# Patient Record
Sex: FEMALE | Race: WHITE | NOT HISPANIC OR LATINO | Employment: STUDENT | URBAN - METROPOLITAN AREA
[De-identification: names, ages, dates, MRNs, and addresses within clinical notes are randomized per-mention and may not be internally consistent; named-entity substitution may affect disease eponyms.]

---
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
## 2018-01-14 VITALS
HEIGHT: 64 IN | OXYGEN SATURATION: 99 % | TEMPERATURE: 99.1 F | HEART RATE: 81 BPM | WEIGHT: 130 LBS | BODY MASS INDEX: 22.2 KG/M2 | DIASTOLIC BLOOD PRESSURE: 64 MMHG | SYSTOLIC BLOOD PRESSURE: 110 MMHG

## 2018-01-18 NOTE — PROGRESS NOTES
Assessment    1  Encounter for preventive health examination (V70 0) (Z00 00)    Plan  Health Maintenance    · Follow-up visit in 1 year Evaluation and Treatment  Follow-up  Status: Hold For -  Scheduling  Requested for: [id number]    Discussion/Summary    Impression:   No growth, development, feeding, skin and sleep concerns  no medical problems  Anticipatory guidance addressed as per the history of present illness section  No vaccines needed  She is not on any medications  Information discussed with patient and Parent/Guardian  Pt is to follow up if she feels she is experiencing depression since her screen was showing mild depression  Pt does not think she is depressed  Forms are completed  The patient, patient's family was counseled regarding diagnostic results, instructions for management, risk factor reductions, patient and family education, impressions, risks and benefits of treatment options, importance of compliance with treatment  Possible side effects of new medications were reviewed with the patient/guardian today  The treatment plan was reviewed with the patient/guardian  The patient/guardian understands and agrees with the treatment plan     Self Referrals: No      Chief Complaint  Patient here today for college physical  No complaint of pain  Patient needs forms completed and eye exam       History of Present Illness  HM, 12-18 years Female (Brief): Cale Cota presents today for routine health maintenance with her mother  General Health: The child's health since the last visit is described as good   no illness since last visit  Dental hygiene: Good  Immunization status: Up to date   the patient has not had any significant adverse reactions to immunizations  Caregiver concerns:   Caregivers deny concerns regarding nutrition, sleep, behavior, school, development and elimination  Menstrual status:  The patient is menarcheal    Menses: Menstrual history:   LMP: it was of a normal amount and duration  The cycles have been regular  Nutrition/Elimination:   Diet:  her current diet is diverse and healthy  The patient does not use dietary supplements  No elimination issues are expressed  Sleep:  No sleep issues are reported  Behavior: The child's temperament is described as calm  No behavior issues identified  Health Risks:  No significant risk factors are identified  Childcare/School: The child stays home alone  Childcare is provided in the child's home  She is college  School performance has been excellent  Sports Participation Questions:   HPI: 23year old female here with her stepmother for check up and completion and verification of forms and immunizations for [facility]  Review of Systems    Constitutional: No complaints of fever or chills, feels well, no tiredness, no recent weight gain or loss  Eyes: No complaints of eye pain, no discharge, no eyesight problems, eyes do not itch, no red or dry eyes  ENT: no complaints of nasal discharge, no hoarseness, no earache, no nosebleeds, no loss of hearing, no sore throat  Cardiovascular: No complaints of chest pain, no palpitations, normal heart rate, no lower extremity edema  Respiratory: No complaints of cough, no shortness of breath, no wheezing, no leg claudication  Gastrointestinal: No complaints of abdominal pain, no nausea or vomiting, no constipation, no diarrhea or bloody stools  Genitourinary: No complaints of incontinence, no pelvic pain, no dysuria or dysmenorrhea, no abnormal vaginal bleeding or vaginal discharge  Musculoskeletal: No complaints of limb swelling or limb pain, no myalgias, no joint swelling or joint stiffness  Integumentary: No complaints of skin rash, no skin lesions or wounds, no itching, no breast pain, no breast lump  Neurological: No complaints of headache, no numbness or tingling, no confusion, no dizziness, no limb weakness, no convulsions or fainting, no difficulty walking  Psychiatric: No complaints of feeling depressed, no suicidal thoughts, no emotional problems, no anxiety, no sleep disturbances, no change in personality  Endocrine: No complaints of feeling weak, no muscle weakness, no deepening of voice, no hot flashes or proptosis  Hematologic/Lymphatic: No complaints of swollen glands, no neck swollen glands, does not bleed or bruise easily  ROS reported by the patient  ROS reviewed  Past Medical History    · History of Cough with congestion of paranasal sinus (461 9,786 2) (J01 80,R05)   · History of acute otitis media (V12 49) (Z86 69)   · History of Need for Menactra vaccination (V03 89) (Z23)    Family History  Mother    · Family history of Asthma (493 90) (J45 909)  Father    · Family history of Hypertension (401 9) (I10)  Sister    · Family history of Asthma (493 90) (J37 80)  Paternal Grandmother    · Family history of Diabetes (250 00) (E11 9)  Paternal Grandfather    · Family history of Heart disease (429 9) (I51 9)   · Family history of Hypertension (401 9) (I10)  Paternal Uncle    · Family history of Hypertension (401 9) (I10)    Social History    · Does not drink alcohol (V49 89) (Z78 9)   · Never a smoker   · No drug use    Current Meds   1  No Reported Medications Recorded    Allergies    1  No Known Drug Allergies    Immunizations   ** Printed in Appendix #1 below  Vitals   Recorded: [id number] 09:40AM   Heart Rate 69   Systolic 317   Diastolic 62   Height 5 ft 4 in   Weight 126 lb 6 4 oz   BMI Calculated 21 7   BSA Calculated 1 61   BMI Percentile 53 %   2-20 Stature Percentile 46 %   2-20 Weight Percentile 52 %   O2 Saturation 99     Physical Exam    Constitutional - General appearance: No acute distress, well appearing and well nourished  Eyes - Conjunctiva and lids: No injection, edema or discharge  Pupils and irises: Equal, round, reactive to light bilaterally     Ears, Nose, Mouth, and Throat - External inspection of ears and nose: Normal without deformities or discharge  Otoscopic examination: Tympanic membranes gray, translucent with good bony landmarks and light reflex  Canals patent without erythema  Oropharynx: Moist mucosa, normal tongue and tonsils without lesions  Neck - Neck: Supple, symmetric, no masses  Pulmonary - Respiratory effort: Normal respiratory rate and rhythm, no increased work of breathing  Auscultation of lungs: Clear bilaterally  Cardiovascular - Auscultation of heart: Regular rate and rhythm, normal S1 and S2, no murmur  Pedal pulses: Normal, 2+ bilaterally  Examination of extremities for edema and/or varicosities: Normal    Abdomen - Abdomen: Normal bowel sounds, soft, non-tender, no masses  Liver and spleen: No hepatomegaly or splenomegaly  Lymphatic - Palpation of lymph nodes in neck: No anterior or posterior cervical lymphadenopathy  Musculoskeletal - Gait and station: Normal gait  Digits and nails: Normal without clubbing or cyanosis  Inspection/palpation of joints, bones, and muscles: Normal    Skin - Skin and subcutaneous tissue: Normal    Neurologic - Cranial nerves: Normal  Reflexes: Normal  Sensation: Normal    Psychiatric - Orientation to person, place, and time: Normal  Mood and affect: Normal       Results/Data  PHQ-9 Adult Depression Screening [id number] 10:30AM User, [facility] [employer]     Test Name Result Flag Reference   PHQ-9 Adult Depression Score 9     Over the last two weeks, how often have you been bothered by any of the following problems?   Little interest or pleasure in doing things: Not at all - 0  Feeling down, depressed, or hopeless: Not at all - 0  Trouble falling or staying asleep, or sleeping too much: Several days - 1  Feeling tired or having little energy: Nearly every day - 3  Poor appetite or over eating: Not at all - 0  Feeling bad about yourself - or that you are a failure or have let yourself or your family down: Several days - 1  Trouble concentrating on things, such as reading the newspaper or watching television: More than half the days - 2  Moving or speaking so slowly that other people could have noticed   Or the opposite -  being so fidgety or restless that you have been moving around a lot more than usual: More than half the days - 2  Thoughts that you would be better off dead, or of hurting yourself in some way: Not at all - 0   PHQ-9 Adult Depression Screening Negative     PHQ-9 Difficulty Level Not difficult at all     PHQ-9 Severity Mild Depression         Procedure    Procedure:   Results: 20/20 in both eyes without corrective device, 20/30 in the right eye without corrective device, 20/25 in the left eye without corrective device, 20/13 in both eyes with corrective device, 20/13 in the right eye with corrective device, 20/10 in the left eye with corrective device      Signatures   Electronically signed by : JENAE Rae; 2017 12:08PM EST                       (Author)    Electronically signed by : JUSTIN Chapman ; 2017 12:58PM EST                          Appendix #1     Patient: Tahmina Fragoso; : 2000; MRN: [id number]      1 2 3 4 5    DTP/DTaP  04-Mar-1999  (3m) 11-May-1999  (6m) 15-Oct-1999  (11m) 2001  (2y) 2004  (5y)    Hepatitis B  04-Mar-1999  (3m) 25-May-1999  (6m) 2000  (14m)      HIB  04-Mar-1999  (3m) 25-May-1999  (6m) 05-Oct-1999  (10m) 2001  (2y)     Meningococcal  09-Mar-2010  (11y) 20-Oct-2016  (17y)       MMR  2001  (2y) 2004  (5y)       Polio  1999  (55d) 25-May-1999  (6m) 2000  (14m) 2004  (5y)     Tdap  09-Mar-2010  (11y)        Varicella  2000  (14m) 09-Mar-2010  (11y)

## 2018-01-22 VITALS
BODY MASS INDEX: 21.58 KG/M2 | HEART RATE: 69 BPM | HEIGHT: 64 IN | SYSTOLIC BLOOD PRESSURE: 102 MMHG | OXYGEN SATURATION: 99 % | DIASTOLIC BLOOD PRESSURE: 62 MMHG | WEIGHT: 126.4 LBS

## 2018-01-23 NOTE — PROGRESS NOTES
Assessment    1  Encounter for female birth control (V25 8) (Z27 46)    Plan  Encounter for female birth control    · Junel FE 1/20 1-20 MG-MCG Oral Tablet; take 1 tablet by mouth every day    Discussion/Summary    Patient's birth control pill is renewed  She had her screening for her eye exam on her 's permit  Her health maintenance visit was done in July and has not changed  She is to follow up for a gyn exam and cervical cultures  The patient was counseled regarding instructions for management, risk factor reductions, impressions, risks and benefits of treatment options, importance of compliance with treatment  Possible side effects of new medications were reviewed with the patient/guardian today  The treatment plan was reviewed with the patient/guardian  The patient/guardian understands and agrees with the treatment plan     Self Referrals: No      Chief Complaint  Patient is here for PE for learner's permit and is requesting a prescription for birth control  Patient was receiving Junel Fe 1/20 28-day through school but needs a refill now that she is home  No complaint of pain  History of Present Illness  HM, Adult Female: The patient is being seen for a health maintenance evaluation  The last health maintenance visit was 6 month(s) ago  General Health: The patient's health since the last visit is described as good  Screening:   HPI: 49-year-old female here with her mother for 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  She is also here for birth control pill refill in check  Patient had exam and STD testing while at college  All was normal  She will follow-up here for a gyn exam and cervical cultures  She is not having any problems at this point with the birth control pill      Review of Systems    Constitutional: No fever, no chills, feels well, no tiredness, no recent weight gain or weight loss     Eyes: No complaints of eye pain, no red eyes, no eyesight problems, no discharge, no dry eyes, no itching of eyes  Cardiovascular: No complaints of slow heart rate, no fast heart rate, no chest pain, no palpitations, no leg claudication, no lower extremity edema  Respiratory: No complaints of shortness of breath, no wheezing, no cough, no SOB on exertion, no orthopnea, no PND  Genitourinary: No complaints of dysuria, no incontinence, no pelvic pain, no dysmenorrhea, no vaginal discharge or bleeding  ROS reviewed  Past Medical History    · History of Cough with congestion of paranasal sinus (461 9,786 2) (J01 80,R05)   · History of acute otitis media (V12 49) (Z86 69)   · History of Need for Menactra vaccination (V03 89) (Z23)    Family History  Mother    · Family history of Asthma (493 90) (J45 909)  Father    · Family history of Hypertension (401 9) (I10)  Sister    · Family history of Asthma (493 90) (J37 80)  Paternal Grandmother    · Family history of Diabetes (250 00) (E11 9)  Paternal Grandfather    · Family history of Heart disease (429 9) (I51 9)   · Family history of Hypertension (401 9) (I10)  Paternal Uncle    · Family history of Hypertension (401 9) (I10)    Social History    · Does not drink alcohol (V49 89) (Z78 9)   · Never a smoker   · No drug use    Current Meds   1  No Reported Medications Recorded    Allergies    1  No Known Drug Allergies    Vitals   Recorded: 01NRV0543 09:33AM   Heart Rate 84   Systolic 650   Diastolic 74   Height 5 ft 4 in   Weight 130 lb 2 oz   BMI Calculated 22 34   BSA Calculated 1 63   BMI Percentile 59 %   2-20 Stature Percentile 46 %   2-20 Weight Percentile 56 %   O2 Saturation 99     Physical Exam    Constitutional   General appearance: No acute distress, well appearing and well nourished  Eyes   Conjunctiva and lids: No swelling, erythema or discharge  glasses  Pupils and irises: Equal, round and reactive to light  Pulmonary   Respiratory effort: No increased work of breathing or signs of respiratory distress      Auscultation of lungs: Clear to auscultation  Cardiovascular   Auscultation of heart: Normal rate and rhythm, normal S1 and S2, without murmurs  Examination of extremities for edema and/or varicosities: Normal     Skin   Skin and subcutaneous tissue: Abnormal   Ganglion cyst on her right anterior ankle  Neurologic   Cranial nerves: Cranial nerves 2-12 intact  Reflexes: 2+ and symmetric  Sensation: No sensory loss      Psychiatric   Orientation to person, place, and time: Normal     Mood and affect: Normal        Procedure    Procedure:   Results: 20/10 in both eyes with corrective device, 20/10 in the right eye with corrective device, 20/10 in the left eye with corrective device      Signatures   Electronically signed by : Lynne Wheeler, [facility]; Dec 29 2017  9:54AM EST                       (Author)    Electronically signed by : JUSTIN Singer ; Dec 29 2017 10:10AM EST

## 2018-01-24 ENCOUNTER — CLINICAL SUPPORT (OUTPATIENT)
Dept: FAMILY MEDICINE CLINIC | Facility: CLINIC | Age: 18
End: 2018-01-24

## 2018-01-24 VITALS
DIASTOLIC BLOOD PRESSURE: 74 MMHG | BODY MASS INDEX: 22.22 KG/M2 | WEIGHT: 130.13 LBS | OXYGEN SATURATION: 99 % | HEIGHT: 64 IN | HEART RATE: 84 BPM | SYSTOLIC BLOOD PRESSURE: 118 MMHG

## 2018-01-24 DIAGNOSIS — Z11.1 ENCOUNTER FOR PPD SKIN TEST READING: ICD-10-CM

## 2018-01-24 PROCEDURE — NURSE

## 2018-01-24 PROCEDURE — 99999 PR OFFICE/OUTPT VISIT,PROCEDURE ONLY: CPT

## 2018-01-24 RX ORDER — NORETHINDRONE ACETATE AND ETHINYL ESTRADIOL 1MG-20(21)
1 KIT ORAL DAILY
COMMUNITY
Start: 2017-12-29 | End: 2018-07-12 | Stop reason: SDUPTHER

## 2018-07-12 ENCOUNTER — TELEPHONE (OUTPATIENT)
Dept: FAMILY MEDICINE CLINIC | Facility: CLINIC | Age: 18
End: 2018-07-12

## 2018-07-12 DIAGNOSIS — Z78.9 USES BIRTH CONTROL: Primary | ICD-10-CM

## 2018-07-12 RX ORDER — NORETHINDRONE ACETATE AND ETHINYL ESTRADIOL 1MG-20(21)
1 KIT ORAL DAILY
Qty: 28 TABLET | Refills: 0 | Status: SHIPPED | OUTPATIENT
Start: 2018-07-12 | End: 2018-07-18 | Stop reason: SDUPTHER

## 2018-07-12 NOTE — TELEPHONE ENCOUNTER
Pt has an appt next Wednesday for c/u but is out of birth control now- can you fill until then?   [facility]

## 2018-07-18 ENCOUNTER — OFFICE VISIT (OUTPATIENT)
Dept: FAMILY MEDICINE CLINIC | Facility: CLINIC | Age: 18
End: 2018-07-18
Payer: COMMERCIAL

## 2018-07-18 VITALS
OXYGEN SATURATION: 99 % | SYSTOLIC BLOOD PRESSURE: 112 MMHG | HEART RATE: 57 BPM | BODY MASS INDEX: 22.91 KG/M2 | WEIGHT: 134.2 LBS | HEIGHT: 64 IN | DIASTOLIC BLOOD PRESSURE: 62 MMHG | TEMPERATURE: 98.8 F

## 2018-07-18 DIAGNOSIS — Z30.41 ENCOUNTER FOR SURVEILLANCE OF CONTRACEPTIVE PILLS: Primary | ICD-10-CM

## 2018-07-18 DIAGNOSIS — Z78.9 USES BIRTH CONTROL: ICD-10-CM

## 2018-07-18 PROCEDURE — 99213 OFFICE O/P EST LOW 20 MIN: CPT | Performed by: PHYSICIAN ASSISTANT

## 2018-07-18 RX ORDER — NORETHINDRONE ACETATE AND ETHINYL ESTRADIOL 1MG-20(21)
1 KIT ORAL DAILY
Qty: 84 TABLET | Refills: 1 | Status: SHIPPED | OUTPATIENT
Start: 2018-07-18 | End: 2018-08-06 | Stop reason: ALTCHOICE

## 2018-07-18 NOTE — PROGRESS NOTES
Assessment/Plan:       Diagnoses and all orders for this visit:    Encounter for surveillance of contraceptive pills    Uses birth control  -     norethindrone-ethinyl estradiol (JUNEL FE 1/20) 1-20 MG-MCG per tablet; Take 1 tablet by mouth daily          Subjective:      Patient ID: Yumiko Goins is a 21 y o  female  Pt is here for follow up of OCP  She is not having problems or side effects  She wishes to continue taking them at this time  Medication Refill   This is a chronic problem  The current episode started more than 1 year ago  The problem occurs daily  The problem has been resolved  Nothing aggravates the symptoms  She has tried nothing for the symptoms  The following portions of the patient's history were reviewed and updated as appropriate:   She has no past medical history on file  ,   does not have any pertinent problems on file  ,   has no past surgical history on file  ,  family history includes Asthma in her mother and sister; Diabetes in her paternal grandmother; Heart disease in her paternal grandfather; Hypertension in her father, paternal grandfather, and paternal uncle ,   reports that she has never smoked  She has never used smokeless tobacco  She reports that she does not drink alcohol or use drugs  ,  has No Known Allergies     Current Outpatient Prescriptions   Medication Sig Dispense Refill    norethindrone-ethinyl estradiol (JUNEL FE 1/20) 1-20 MG-MCG per tablet Take 1 tablet by mouth daily 84 tablet 1     No current facility-administered medications for this visit  Review of Systems   Cardiovascular: Negative  Gastrointestinal: Negative  Genitourinary: Negative  Skin: Negative  Neurological: Negative  Objective:  Vitals:    07/18/18 0819   BP: 112/62   Pulse: 57   Temp: 98 8 °F (37 1 °C)   SpO2: 99%   Weight: 60 9 kg (134 lb 3 2 oz)   Height: 5' 4" (1 626 m)     Body mass index is 23 04 kg/m²       Physical Exam   Constitutional: She is oriented to person, place, and time  She appears well-developed and well-nourished  Cardiovascular: Normal rate, regular rhythm, normal heart sounds and intact distal pulses  Pulmonary/Chest: Effort normal and breath sounds normal    Abdominal: Soft  Bowel sounds are normal    Neurological: She is alert and oriented to person, place, and time  Psychiatric: She has a normal mood and affect  Her behavior is normal  Judgment and thought content normal    Nursing note and vitals reviewed

## 2018-08-06 DIAGNOSIS — Z78.9 USES BIRTH CONTROL: ICD-10-CM

## 2018-08-06 RX ORDER — NORETHINDRONE ACETATE AND ETHINYL ESTRADIOL AND FERROUS FUMARATE 1MG-20(21)
KIT ORAL
Qty: 28 TABLET | Refills: 0 | Status: SHIPPED | OUTPATIENT
Start: 2018-08-06 | End: 2019-01-09 | Stop reason: ALTCHOICE

## 2019-01-09 ENCOUNTER — OFFICE VISIT (OUTPATIENT)
Dept: FAMILY MEDICINE CLINIC | Facility: CLINIC | Age: 19
End: 2019-01-09
Payer: COMMERCIAL

## 2019-01-09 VITALS
SYSTOLIC BLOOD PRESSURE: 122 MMHG | HEART RATE: 76 BPM | OXYGEN SATURATION: 96 % | TEMPERATURE: 98.5 F | DIASTOLIC BLOOD PRESSURE: 92 MMHG

## 2019-01-09 DIAGNOSIS — N75.0 BARTHOLIN GLAND CYST: Primary | ICD-10-CM

## 2019-01-09 PROCEDURE — 99213 OFFICE O/P EST LOW 20 MIN: CPT | Performed by: NURSE PRACTITIONER

## 2019-01-09 NOTE — ASSESSMENT & PLAN NOTE
Warm sitz bath  Warm compress  No sexual activity   Follow up if the lump increase in size or if the drainage has a strong odor

## 2019-01-09 NOTE — PROGRESS NOTES
Assessment/Plan:    Bartholin gland cyst  Warm sitz bath  Warm compress  No sexual activity   Follow up if the lump increase in size or if the drainage has a strong odor         Problem List Items Addressed This Visit     Bartholin gland cyst - Primary     Warm sitz bath  Warm compress  No sexual activity   Follow up if the lump increase in size or if the drainage has a strong odor                 Subjective:      Patient ID: Mine Mchugh is a 19 y o  female  Pt discovered a lump on vagina this morning  Reports no discharge, denies pain  First occurrence  LMP - 12/25/2018  Boyfriend is in room with patient  The following portions of the patient's history were reviewed and updated as appropriate: allergies, current medications, past family history, past medical history, past social history, past surgical history and problem list     Review of Systems   Constitutional: Negative  HENT: Negative  Eyes: Negative  Respiratory: Negative  Cardiovascular: Negative  Gastrointestinal: Negative  Endocrine: Negative  Genitourinary: Negative  Painless lump on vagina   Musculoskeletal: Negative  Skin: Negative  Allergic/Immunologic: Negative  Neurological: Negative  Hematological: Negative  Psychiatric/Behavioral: Negative  Objective:      /92   Pulse 76   Temp 98 5 °F (36 9 °C)   LMP 12/31/2018 (Approximate)   SpO2 96%          Physical Exam   Constitutional: She is oriented to person, place, and time  She appears well-developed and well-nourished  HENT:   Head: Normocephalic and atraumatic  Right Ear: External ear normal    Left Ear: External ear normal    Eyes: Pupils are equal, round, and reactive to light  Neck: Normal range of motion  Cardiovascular: Normal rate and regular rhythm  Pulmonary/Chest: Effort normal    Abdominal: Soft  Bowel sounds are normal    Genitourinary:       There is tenderness in the vagina   No vaginal discharge found    Musculoskeletal: Normal range of motion  Neurological: She is alert and oriented to person, place, and time  Skin: Skin is warm and dry  Psychiatric: She has a normal mood and affect  Nursing note and vitals reviewed

## 2019-01-09 NOTE — PATIENT INSTRUCTIONS
Apply warm compresses  Take warm baths  Maintain proper hygiene, wipe front to back  Tylenol or motrin for fever and/or pain  Warm sitz bath  Warm compress  No sexual activity   Follow up if the lump increase in size or if the drainage has a strong odor      Bartholin Cyst   AMBULATORY CARE:   A Bartholin cyst  is a lump near the opening to your vagina  You may have pain in this area when you walk or have sex  A Bartholin cyst is caused by blockage of your Bartholin gland  You have a Bartholin gland on each side of your vagina  The glands produce fluid to moisten your vagina  Over time the fluid can build up in the gland and form a cyst  The cyst may become infected  You may be at risk for a Bartholin cyst if you have a sexually transmitted infection  An injury or surgery near your vagina may also increase you risk  Contact your gynecologist or healthcare provider if:   · You have a fever  · Your cyst gets larger or becomes more painful  · Your cyst returns after treatment  · Your drain falls out  · You have pus, redness, or swelling where the cyst was drained  · You have questions or concerns about your condition or care  Treatment for a Bartholin cyst  may include treatment at home  Instead, you may need any of the following:  · Medicine  may be given to treat or prevent an infection  Medicine may also be given to decrease pain and swelling  · Incision and drainage  is a procedure to drain the cyst  Your healthcare provider will make an opening in the cyst so it can drain  He or she may also place packing or a drain in your wound  The drain will help keep your gland open and drain extra fluid  The packing will be removed in 24 to 48 hours  The drain may be left in place for 4 to 6 weeks  · Surgery  may be needed if other treatments do not work  Surgery may be done to hold your gland open and prevent another blockage   Surgery may also be done to remove 1 or both of your Bartholin glands  Self-care if your cyst was not drained:   · Take a sitz bath  3 to 4 times each day or as directed  A sitz bath may help relieve swelling and pain  It will also help open your Bartholin glands so they drain normally  Place a clean towel on the bottom of your bath tub  Fill your bath tub with warm water up to your hips  You can also buy a sitz bath that fits in your toilet  Sit in the water for 10 minutes  · Apply a warm compress  to your cyst  This may relieve swelling and pain  A warm compress will also help open your Bartholin glands so they drain normally  Wet a washcloth in warm, but not hot, water  Apply the compress for 10 minutes  Repeat 4 times each day  · Keep the area around your vagina clean  Always wipe front to back  Shower once a day  Gently pat the area dry after a shower  Self-care after an incision and drainage of your cyst:   · Take a sitz bath  in 24 to 48 hours or as directed  You may need to wait to take a sitz bath until after your packing is removed  A sitz bath may help relieve swelling and pain  Take a sitz bath 3 to 4 times each day for 3 days  Place a clean towel on the bottom of your bath tub  Fill your bath tub with warm water up to your hips  You can also buy a sitz bath that fits in your toilet  Sit in the water for 10 minutes  · Wear a sanitary pad  to absorb drainage from your wound  You may have drainage for a few weeks after your cyst is drained  · Ask your healthcare provider if it is okay to have sex  Sex may cause your drain to fall out  It may also increase your risk for an infection  · Keep the area around your vagina clean  Always wipe front to back  Shower once a day  Gently pat the area dry after a shower  Follow up with your healthcare provider as directed: If packing was placed in your wound, return in 24 to 48 hours to have it removed  If a drain was placed, you will need to return in 4 to 6 weeks to have it removed   Write down your questions so you remember to ask them during your visits  © 2017 [employer] Information is for End User's use only and may not be sold, redistributed or otherwise used for commercial purposes  All illustrations and images included in CareNotes® are the copyrighted property of [employer]  or [employer]  The above information is an  only  It is not intended as medical advice for individual conditions or treatments  Talk to your doctor, nurse or pharmacist before following any medical regimen to see if it is safe and effective for you

## 2019-01-31 ENCOUNTER — OFFICE VISIT (OUTPATIENT)
Dept: FAMILY MEDICINE CLINIC | Facility: CLINIC | Age: 19
End: 2019-01-31
Payer: COMMERCIAL

## 2019-01-31 VITALS
HEIGHT: 64 IN | BODY MASS INDEX: 22.2 KG/M2 | WEIGHT: 130 LBS | RESPIRATION RATE: 17 BRPM | SYSTOLIC BLOOD PRESSURE: 118 MMHG | OXYGEN SATURATION: 99 % | HEART RATE: 66 BPM | DIASTOLIC BLOOD PRESSURE: 62 MMHG | TEMPERATURE: 97.9 F

## 2019-01-31 DIAGNOSIS — N75.0 BARTHOLIN GLAND CYST: Primary | ICD-10-CM

## 2019-01-31 PROCEDURE — 99213 OFFICE O/P EST LOW 20 MIN: CPT | Performed by: NURSE PRACTITIONER

## 2019-01-31 RX ORDER — NORETHINDRONE ACETATE AND ETHINYL ESTRADIOL 1MG-20(21)
1 KIT ORAL DAILY
COMMUNITY
End: 2019-04-25 | Stop reason: SDUPTHER

## 2019-01-31 RX ORDER — AMOXICILLIN AND CLAVULANATE POTASSIUM 875; 125 MG/1; MG/1
1 TABLET, FILM COATED ORAL EVERY 12 HOURS SCHEDULED
Qty: 20 TABLET | Refills: 0 | Status: SHIPPED | OUTPATIENT
Start: 2019-01-31 | End: 2019-02-10

## 2019-01-31 RX ORDER — PENICILLIN V POTASSIUM 250 MG/1
250 TABLET ORAL 4 TIMES DAILY
Qty: 28 TABLET | Refills: 0 | Status: SHIPPED | OUTPATIENT
Start: 2019-01-31 | End: 2019-01-31 | Stop reason: CLARIF

## 2019-01-31 NOTE — PROGRESS NOTES
Assessment/Plan:    Bartholin gland cyst  Augmentin bid x 7 days  Continue sitz bath  Ibuprofen as needed for pain or fever         Problem List Items Addressed This Visit     Bartholin gland cyst - Primary     Augmentin bid x 7 days  Continue sitz bath  Ibuprofen as needed for pain or fever         Relevant Medications    amoxicillin-clavulanate (AUGMENTIN) 875-125 mg per tablet            Subjective:      Patient ID: Leda Patel is a 19 y o  female  Pt is here with follow up of a bartholin cyst  Has tried sitz baths  Denies pain, or drainage  States its just uncomfortable to walk  The following portions of the patient's history were reviewed and updated as appropriate: allergies, current medications, past family history, past medical history, past social history, past surgical history and problem list     Review of Systems   Constitutional: Negative  HENT: Negative  Eyes: Negative  Respiratory: Negative  Cardiovascular: Negative  Gastrointestinal: Negative  Endocrine: Negative  Genitourinary:        Lump in vagina, left side   Musculoskeletal: Negative  Allergic/Immunologic: Negative  Neurological: Negative  Hematological: Negative  Psychiatric/Behavioral: Negative  Objective:      /62   Pulse 66   Temp 97 9 °F (36 6 °C)   Resp 17   Ht 5' 4" (1 626 m)   Wt 59 kg (130 lb)   SpO2 99%   BMI 22 31 kg/m²          Physical Exam   Constitutional: She is oriented to person, place, and time  She appears well-developed and well-nourished  HENT:   Head: Normocephalic and atraumatic  Right Ear: External ear normal    Left Ear: External ear normal    Eyes: Pupils are equal, round, and reactive to light  Neck: Normal range of motion  Cardiovascular: Normal rate and regular rhythm  Pulmonary/Chest: Effort normal    Abdominal: Soft  Bowel sounds are normal    Genitourinary:       There is lesion on the left labia     Musculoskeletal: Normal range of motion  Neurological: She is alert and oriented to person, place, and time  Skin: Skin is warm and dry  Psychiatric: She has a normal mood and affect           Labs:

## 2019-04-25 DIAGNOSIS — Z78.9 USES BIRTH CONTROL: Primary | ICD-10-CM

## 2019-04-26 RX ORDER — NORETHINDRONE ACETATE AND ETHINYL ESTRADIOL 1MG-20(21)
KIT ORAL
Qty: 84 TABLET | Refills: 1 | Status: SHIPPED | OUTPATIENT
Start: 2019-04-26

## 2019-09-05 ENCOUNTER — TELEPHONE (OUTPATIENT)
Dept: FAMILY MEDICINE CLINIC | Facility: CLINIC | Age: 19
End: 2019-09-05

## 2019-09-05 NOTE — TELEPHONE ENCOUNTER
T/c from Pt requesting to have a PPD for school  Ok to schedule? There is a form that needs be completed as well for the TB, not PE form     [phone] call back

## 2019-09-09 ENCOUNTER — CLINICAL SUPPORT (OUTPATIENT)
Dept: FAMILY MEDICINE CLINIC | Facility: CLINIC | Age: 19
End: 2019-09-09
Payer: COMMERCIAL

## 2019-09-09 DIAGNOSIS — Z11.1 PPD SCREENING TEST: ICD-10-CM

## 2019-09-09 DIAGNOSIS — Z11.1 ENCOUNTER FOR PPD SKIN TEST READING: Primary | ICD-10-CM

## 2019-09-09 PROCEDURE — 86580 TB INTRADERMAL TEST: CPT | Performed by: PHYSICIAN ASSISTANT

## 2019-09-11 ENCOUNTER — CLINICAL SUPPORT (OUTPATIENT)
Dept: FAMILY MEDICINE CLINIC | Facility: CLINIC | Age: 19
End: 2019-09-11

## 2019-09-11 DIAGNOSIS — Z11.1 ENCOUNTER FOR PPD SKIN TEST READING: Primary | ICD-10-CM

## 2019-09-11 LAB
INDURATION: 0 MM
TB SKIN TEST: NEGATIVE

## 2019-09-11 NOTE — PROGRESS NOTES
Patient reports to office for TB Read  0 0mm on left forearm  Patient will follow up as scheduled       Aubree Munguia

## 2020-01-21 ENCOUNTER — TELEPHONE (OUTPATIENT)
Dept: FAMILY MEDICINE CLINIC | Facility: CLINIC | Age: 20
End: 2020-01-21

## 2020-01-21 NOTE — TELEPHONE ENCOUNTER
Pt dropped off a form for completion regarding TB Test    Form on [facility] Desk    Call if any questions or when done  [phone]

## 2020-05-14 ENCOUNTER — OFFICE VISIT (OUTPATIENT)
Dept: FAMILY MEDICINE CLINIC | Facility: CLINIC | Age: 20
End: 2020-05-14
Payer: COMMERCIAL

## 2020-05-14 VITALS
HEIGHT: 64 IN | WEIGHT: 132 LBS | OXYGEN SATURATION: 99 % | HEART RATE: 59 BPM | BODY MASS INDEX: 22.53 KG/M2 | SYSTOLIC BLOOD PRESSURE: 100 MMHG | DIASTOLIC BLOOD PRESSURE: 72 MMHG | TEMPERATURE: 97 F

## 2020-05-14 DIAGNOSIS — N75.0 BARTHOLIN GLAND CYST: Primary | ICD-10-CM

## 2020-05-14 PROCEDURE — 1036F TOBACCO NON-USER: CPT | Performed by: PHYSICIAN ASSISTANT

## 2020-05-14 PROCEDURE — 99213 OFFICE O/P EST LOW 20 MIN: CPT | Performed by: PHYSICIAN ASSISTANT

## 2020-05-14 PROCEDURE — 3008F BODY MASS INDEX DOCD: CPT | Performed by: PHYSICIAN ASSISTANT

## 2020-05-14 RX ORDER — AMOXICILLIN AND CLAVULANATE POTASSIUM 875; 125 MG/1; MG/1
1 TABLET, FILM COATED ORAL EVERY 12 HOURS SCHEDULED
Qty: 20 TABLET | Refills: 0 | Status: SHIPPED | OUTPATIENT
Start: 2020-05-14 | End: 2020-05-24

## 2020-06-12 ENCOUNTER — HOSPITAL ENCOUNTER (EMERGENCY)
Facility: HOSPITAL | Age: 20
Discharge: HOME | End: 2020-06-12
Attending: EMERGENCY MEDICINE | Admitting: EMERGENCY MEDICINE
Payer: COMMERCIAL

## 2020-06-12 VITALS
SYSTOLIC BLOOD PRESSURE: 129 MMHG | BODY MASS INDEX: 23.5 KG/M2 | HEART RATE: 77 BPM | WEIGHT: 136.91 LBS | RESPIRATION RATE: 18 BRPM | TEMPERATURE: 97.9 F | DIASTOLIC BLOOD PRESSURE: 65 MMHG | OXYGEN SATURATION: 98 %

## 2020-06-12 DIAGNOSIS — N75.0 BARTHOLIN GLAND CYST: Primary | ICD-10-CM

## 2020-06-12 PROCEDURE — 10061 I&D ABSCESS COMP/MULTIPLE: CPT | Performed by: EMERGENCY MEDICINE

## 2020-06-12 PROCEDURE — 99284 EMERGENCY DEPT VISIT MOD MDM: CPT | Performed by: EMERGENCY MEDICINE

## 2020-06-12 PROCEDURE — 99283 EMERGENCY DEPT VISIT LOW MDM: CPT

## 2020-06-12 RX ORDER — LIDOCAINE HYDROCHLORIDE AND EPINEPHRINE 10; 10 MG/ML; UG/ML
20 INJECTION, SOLUTION INFILTRATION; PERINEURAL ONCE
Status: COMPLETED | OUTPATIENT
Start: 2020-06-12 | End: 2020-06-12

## 2020-06-12 RX ORDER — SULFAMETHOXAZOLE AND TRIMETHOPRIM 800; 160 MG/1; MG/1
1 TABLET ORAL 2 TIMES DAILY
Qty: 14 TABLET | Refills: 0 | Status: SHIPPED | OUTPATIENT
Start: 2020-06-12 | End: 2020-06-19

## 2020-06-12 RX ORDER — HYDROCODONE BITARTRATE AND ACETAMINOPHEN 5; 325 MG/1; MG/1
1 TABLET ORAL ONCE
Status: COMPLETED | OUTPATIENT
Start: 2020-06-12 | End: 2020-06-12

## 2020-06-12 RX ORDER — HYDROCODONE BITARTRATE AND ACETAMINOPHEN 5; 325 MG/1; MG/1
1 TABLET ORAL EVERY 6 HOURS PRN
Qty: 12 TABLET | Refills: 0 | Status: SHIPPED | OUTPATIENT
Start: 2020-06-12 | End: 2020-07-13 | Stop reason: ALTCHOICE

## 2020-06-12 RX ADMIN — LIDOCAINE HYDROCHLORIDE,EPINEPHRINE BITARTRATE 20 ML: 10; .01 INJECTION, SOLUTION INFILTRATION; PERINEURAL at 21:43

## 2020-06-12 RX ADMIN — HYDROCODONE BITARTRATE AND ACETAMINOPHEN 1 TABLET: 5; 325 TABLET ORAL at 21:43

## 2020-07-13 ENCOUNTER — OFFICE VISIT (OUTPATIENT)
Dept: OBGYN CLINIC | Facility: CLINIC | Age: 20
End: 2020-07-13
Payer: COMMERCIAL

## 2020-07-13 VITALS
TEMPERATURE: 98 F | DIASTOLIC BLOOD PRESSURE: 62 MMHG | SYSTOLIC BLOOD PRESSURE: 104 MMHG | HEIGHT: 64 IN | BODY MASS INDEX: 23.22 KG/M2 | WEIGHT: 136 LBS

## 2020-07-13 DIAGNOSIS — N75.0 BARTHOLIN GLAND CYST: Primary | ICD-10-CM

## 2020-07-13 PROCEDURE — 3008F BODY MASS INDEX DOCD: CPT | Performed by: STUDENT IN AN ORGANIZED HEALTH CARE EDUCATION/TRAINING PROGRAM

## 2020-07-13 PROCEDURE — 99213 OFFICE O/P EST LOW 20 MIN: CPT | Performed by: STUDENT IN AN ORGANIZED HEALTH CARE EDUCATION/TRAINING PROGRAM

## 2020-07-13 PROCEDURE — 1036F TOBACCO NON-USER: CPT | Performed by: STUDENT IN AN ORGANIZED HEALTH CARE EDUCATION/TRAINING PROGRAM

## 2020-07-13 NOTE — PROGRESS NOTES
Pt presents for a follow up from ER  6/12/20 where they did a I&D of a bartholin cyst  States she has a drain in and is here to have removed  Denies any pain, drainage or odors

## 2020-07-13 NOTE — PROGRESS NOTES
Assessment/Plan:     Bartholin gland cyst  Resolved - becker catheter removed  F/u for first annual exam w/ pap smear  Subjective:     Patient ID: Juanita Pelletier is a 22 y o  female  23 yo G0 presents for follow up of bartholin's gland cyst  She had presented to the ED with pain  She had I&D + becker catheter placement  On presentation today reports pain has resolved  She finished her course of abx  No fevers/chills  Shelly Pap has regular periods, maintained on OCP  Review of Systems   All other systems reviewed and are negative  Objective:     Physical Exam   Constitutional: She appears well-nourished  Pulmonary/Chest: Effort normal    Genitourinary:   Genitourinary Comments: Becker catheter in place, removed  Cyst noted to be resolved and healing well  Neurological: She is alert  Psychiatric: She has a normal mood and affect

## 2020-09-14 ENCOUNTER — ANNUAL EXAM (OUTPATIENT)
Dept: OBGYN CLINIC | Facility: CLINIC | Age: 20
End: 2020-09-14
Payer: COMMERCIAL

## 2020-09-14 VITALS
DIASTOLIC BLOOD PRESSURE: 70 MMHG | WEIGHT: 133.2 LBS | SYSTOLIC BLOOD PRESSURE: 120 MMHG | HEIGHT: 64 IN | BODY MASS INDEX: 22.74 KG/M2

## 2020-09-14 DIAGNOSIS — Z01.419 ENCOUNTER FOR GYNECOLOGICAL EXAMINATION (GENERAL) (ROUTINE) WITHOUT ABNORMAL FINDINGS: Primary | ICD-10-CM

## 2020-09-14 PROCEDURE — 99395 PREV VISIT EST AGE 18-39: CPT | Performed by: STUDENT IN AN ORGANIZED HEALTH CARE EDUCATION/TRAINING PROGRAM

## 2020-09-14 PROCEDURE — G0145 SCR C/V CYTO,THINLAYER,RESCR: HCPCS | Performed by: STUDENT IN AN ORGANIZED HEALTH CARE EDUCATION/TRAINING PROGRAM

## 2020-09-14 NOTE — PROGRESS NOTES
Assessment/Plan:    23 yo G0 here for annual exam, doing well  F/u in 1 yr or PRN  Problem List Items Addressed This Visit    Visit Diagnoses     Encounter for gynecological examination (general) (routine) without abnormal findings    -  Primary    Liquid-based pap, screening            Subjective:      Patient ID: Moe Pablo is a 22 y o  female  This is a 22 y o  G0 with Patient's last menstrual period was 09/08/2020 (exact date) currently using combined OCP for contraception  She has regular periods of normal amount and duration  She is sexually active and declines STD testing today  She has no issues with intercourse  Rei Champagne was here in July for Bartholin's cyst  It has resolved but reports she felt tenderness in that area after intercourse 2 days ago  Screening:  Last pap smear: never    Family history: No h/o breast, ovarian, colon cancer  The following portions of the patient's history were reviewed and updated as appropriate: allergies, current medications, past family history, past medical history, past social history, past surgical history and problem list     Review of Systems   Constitutional: Negative  HENT: Negative  Eyes: Negative  Respiratory: Negative  Cardiovascular: Negative  Gastrointestinal: Negative  Endocrine: Negative  Genitourinary: Negative for dyspareunia, dysuria, frequency, menstrual problem, pelvic pain, vaginal discharge and vaginal pain  Musculoskeletal: Negative  Skin: Negative  Allergic/Immunologic: Negative  Neurological: Negative  Hematological: Negative  Psychiatric/Behavioral: Negative  Objective:      /70 (BP Location: Left arm, Patient Position: Sitting, Cuff Size: Standard)   Ht 5' 4" (1 626 m)   Wt 60 4 kg (133 lb 3 2 oz)   LMP 09/08/2020 (Exact Date)   BMI 22 86 kg/m²          Physical Exam  Vitals signs reviewed  Exam conducted with a chaperone present     Neck:      Musculoskeletal: Normal range of motion  Cardiovascular:      Rate and Rhythm: Normal rate  Pulmonary:      Effort: Pulmonary effort is normal    Chest:      Breasts: Breasts are symmetrical          Right: No mass, nipple discharge, skin change or tenderness  Left: No mass, nipple discharge, skin change or tenderness  Abdominal:      Palpations: Abdomen is soft  Genitourinary:     Labia:         Right: No rash  Left: No rash  Vagina: Normal  No signs of injury  Uterus: Not enlarged and not fixed  Adnexa:         Right: No mass  Left: No mass  Comments: No evidence of bartholin's cyst  Left labia nontender today  Skin:     General: Skin is warm and dry  Neurological:      Mental Status: She is alert and oriented to person, place, and time

## 2020-09-18 LAB
LAB AP GYN PRIMARY INTERPRETATION: NORMAL
Lab: NORMAL

## 2020-09-24 ENCOUNTER — TELEPHONE (OUTPATIENT)
Dept: OBGYN CLINIC | Facility: CLINIC | Age: 20
End: 2020-09-24

## 2020-09-24 NOTE — TELEPHONE ENCOUNTER
----- Message from Mansi Allen MD sent at 9/21/2020  7:10 PM EDT -----  Please let Nory Alfredo know that pap smear is negative   thanks

## 2020-11-16 NOTE — TELEPHONE ENCOUNTER
Reason for Disposition  • [1] Fever (or feeling feverish) OR cough AND [2] within 14 Days of COVID-19 EXPOSURE (Close Contact)    Additional Information  • Negative: SEVERE difficulty breathing (e.g., struggling for each breath, speak in single words, bluish lips)  • Negative: Sounds like a life-threatening emergency to the triager  • [1] Adult has symptoms of COVID-19 (fever, cough or SOB) AND [2] major community spread where patient lives AND [3] testing not being done for mild symptoms  • Negative: SEVERE difficulty breathing (e.g., struggling for each breath, speaks in single words)  • Negative: Difficult to awaken or acting confused (e.g., disoriented, slurred speech)  • Negative: Bluish (or gray) lips or face now  • Negative: Shock suspected (e.g., cold/pale/clammy skin, too weak to stand, low BP, rapid pulse)  • Negative: Sounds like a life-threatening emergency to the triager  • [1] COVID-19 suspected (e.g., cough, fever, shortness of breath) AND [2] public health department recommends testing  • Negative: [1] Difficulty breathing (shortness of breath) occurs AND [2] onset > 14 days after COVID-19 EXPOSURE (Close Contact) AND [3] no major community spread  • Negative: [1] Cough occurs AND [2] onset > 14 days after COVID-19 EXPOSURE AND [3] no major community spread  • Negative: [1] Common cold symptoms AND [2] onset > 14 days after COVID-19 EXPOSURE AND [3] no major community spread  • Negative: [1] Difficulty breathing occurs AND [2] within 14 days of COVID-19 EXPOSURE (Close Contact)  • Negative: Patient sounds very sick or weak to the triager  • Negative: [1] Fever (or feeling feverish) OR cough occurs AND [2] within 14 days of travel from another country (international travel)  • Negative: [1] Fever (or feeling feverish) OR cough occurs AND [2] within 14 days of travel from a city or area with major community spread  • Negative: [1] Fever (or feeling feverish) OR cough occurs AND [2] living in area  L/m for pt that prescription was sent to pharmacy with major community spread AND [3] testing being done in the community for symptoms    Protocols used: CORONAVIRUS (COVID-19) EXPOSURE-A-OH, CORONAVIRUS (COVID-19) DIAGNOSED OR [id number]    patient has loss of sense of taste and smell, rash on neck, ST, scratchy throat, cough and chest tightness., no fever

## 2022-11-28 ENCOUNTER — OFFICE VISIT (OUTPATIENT)
Dept: URGENT CARE | Facility: MEDICAL CENTER | Age: 22
End: 2022-11-28

## 2022-11-28 VITALS
TEMPERATURE: 98 F | WEIGHT: 141.6 LBS | RESPIRATION RATE: 18 BRPM | HEART RATE: 92 BPM | OXYGEN SATURATION: 99 % | BODY MASS INDEX: 24.17 KG/M2 | HEIGHT: 64 IN | SYSTOLIC BLOOD PRESSURE: 133 MMHG | DIASTOLIC BLOOD PRESSURE: 72 MMHG

## 2022-11-28 DIAGNOSIS — R68.89 FLU-LIKE SYMPTOMS: Primary | ICD-10-CM

## 2022-11-28 RX ORDER — BROMPHENIRAMINE MALEATE, PSEUDOEPHEDRINE HYDROCHLORIDE, AND DEXTROMETHORPHAN HYDROBROMIDE 2; 30; 10 MG/5ML; MG/5ML; MG/5ML
5 SYRUP ORAL 4 TIMES DAILY PRN
Qty: 120 ML | Refills: 0 | Status: SHIPPED | OUTPATIENT
Start: 2022-11-28 | End: 2022-12-03

## 2022-11-28 NOTE — LETTER
November 28, 2022     Patient: Nellie Rebolledo   YOB: 2000   Date of Visit: 11/28/2022       To Whom it May Concern:    Nellie Rebolledo was seen in my clinic on 11/28/2022  If test is negative patient may return to work when fever free for 24 hours without fever reducing medication  If COVID-19 test is positive patient may return to work on 12/04/2022 with strict masking  If you have any questions or concerns, please don't hesitate to call           Sincerely,          [facility] [place]        CC: No Recipients

## 2022-11-28 NOTE — PROGRESS NOTES
[facility]        NAME: Tristian Maynard is a 23 y o  female  : 2000    MRN: [id number]  DATE: 2022  TIME: 2:29 PM    Assessment and Plan   Flu-like symptoms [R68 89]  1  Flu-like symptoms  Covid/Flu-Office Collect    brompheniramine-pseudoephedrine-DM 30-2-10 MG/5ML syrup            Patient Instructions     Flu-like symptoms  Bromfed as needed for cough/congestion   COVID test sent  Follow up with PCP in 3-5 days  Proceed to  ER if symptoms worsen  Chief Complaint     Chief Complaint   Patient presents with   • Cold Like Symptoms     Pt recently exposed to flu, having flulike symptoms, headaches nausea and congestion           History of Present Illness       40-year-old female who presents complaining of nonproductive cough, body aches, congestion  States that 3 members of the family have influenza  Denies chest pain, shortness of breath      Review of Systems   Review of Systems   Constitutional: Positive for fever  Negative for activity change, appetite change, chills, diaphoresis and fatigue  HENT: Positive for congestion and rhinorrhea  Negative for ear discharge, ear pain, facial swelling, hearing loss, mouth sores, nosebleeds, postnasal drip, sinus pressure, sinus pain, sneezing, sore throat and voice change  Respiratory: Positive for cough  Negative for apnea, choking, chest tightness, shortness of breath, wheezing and stridor  Cardiovascular: Negative            Current Medications       Current Outpatient Medications:   •  brompheniramine-pseudoephedrine-DM 30-2-10 MG/5ML syrup, Take 5 mL by mouth 4 (four) times a day as needed for cough for up to 5 days, Disp: 120 mL, Rfl: 0  •  BLISOVI FE  1-20 MG-MCG per tablet, TAKE 1 TABLET DAILY (Patient not taking: Reported on 2022), Disp: 84 tablet, Rfl: 1    Current Allergies     Allergies as of 2022   • (No Known Allergies)            The following portions of the patient's history were reviewed and updated as appropriate: allergies, current medications, past family history, past medical history, past social history, past surgical history and problem list      No past medical history on file  Past Surgical History:   Procedure Laterality Date   • WISDOM TOOTH EXTRACTION         Family History   Problem Relation Age of Onset   • Asthma Mother    • Hypertension Father    • Asthma Sister    • Diabetes Paternal Grandmother    • Heart disease Paternal Grandfather    • Hypertension Paternal Grandfather    • Hypertension Paternal Uncle          Medications have been verified  Objective   /72 (BP Location: Right arm)   Pulse 92   Temp 98 °F (36 7 °C) (Temporal)   Resp 18   Ht 5' 4" (1 626 m)   Wt 64 2 kg (141 lb 9 6 oz)   LMP 11/27/2022   SpO2 99%   BMI 24 31 kg/m²        Physical Exam     Physical Exam  Constitutional:       General: She is not in acute distress  Appearance: Normal appearance  She is well-developed and well-nourished  She is not diaphoretic  HENT:      Head: Normocephalic and atraumatic  Right Ear: Hearing, tympanic membrane, ear canal and external ear normal       Left Ear: Hearing, tympanic membrane, ear canal and external ear normal       Nose: Rhinorrhea present  Mouth/Throat:      Mouth: Oropharynx is clear and moist and mucous membranes are normal       Pharynx: Uvula midline  Cardiovascular:      Rate and Rhythm: Normal rate and regular rhythm  Pulses: Intact distal pulses  Heart sounds: Normal heart sounds  Pulmonary:      Effort: Pulmonary effort is normal  No respiratory distress  Breath sounds: Normal breath sounds  No stridor  No wheezing, rhonchi or rales  Chest:      Chest wall: No tenderness  Musculoskeletal:      Cervical back: Normal range of motion and neck supple  Lymphadenopathy:      Cervical: Cervical adenopathy present  Neurological:      Mental Status: She is alert

## 2022-11-28 NOTE — PATIENT INSTRUCTIONS
Flu-like symptoms  Bromfed as needed for cough/congestion   COVID test sent  Follow up with PCP in 3-5 days  Proceed to  ER if symptoms worsen

## 2022-11-29 LAB
FLUAV RNA RESP QL NAA+PROBE: POSITIVE
FLUBV RNA RESP QL NAA+PROBE: NEGATIVE
SARS-COV-2 RNA RESP QL NAA+PROBE: NEGATIVE

## 2022-12-13 ENCOUNTER — OFFICE VISIT (OUTPATIENT)
Dept: OBGYN CLINIC | Facility: CLINIC | Age: 22
End: 2022-12-13

## 2022-12-13 VITALS — DIASTOLIC BLOOD PRESSURE: 70 MMHG | WEIGHT: 145 LBS | SYSTOLIC BLOOD PRESSURE: 120 MMHG | BODY MASS INDEX: 24.89 KG/M2

## 2022-12-13 DIAGNOSIS — Z01.419 WOMEN'S ANNUAL ROUTINE GYNECOLOGICAL EXAMINATION: Primary | ICD-10-CM

## 2022-12-13 PROBLEM — Z78.9 USES BIRTH CONTROL: Status: RESOLVED | Noted: 2018-07-18 | Resolved: 2022-12-13

## 2022-12-13 PROBLEM — Z30.41 ENCOUNTER FOR SURVEILLANCE OF CONTRACEPTIVE PILLS: Status: RESOLVED | Noted: 2018-07-18 | Resolved: 2022-12-13

## 2022-12-13 NOTE — PROGRESS NOTES
Subjective    HPI:     Carlos Enrique Abbasi is a 23 y o  nulliparous female  Her menstrual cycles are regular and predictable  Her current method of contraception includes none  She is not currently sexually active  She denies issues with intimacy  She denies /GI and Gyn complaints  She has a history recurrent bartholin gland cyst  She feels safe at home  She denies depression/anxiety  Medical, surgical and family history reviewed  Her dental care is not done - over 2 years  She has dental insurance and will be making an appt  She eats a healthy diet and exercises regularly  She is happy with her weight  Gynecologic History    Patient's last menstrual period was 2022  Gardasil Vaccine Series: no history of receiving series  Last Pap: 2020  Results were: normal    Obstetric History    OB History    Para Term  AB Living   0 0 0 0 0 0   SAB IAB Ectopic Multiple Live Births   0 0 0 0 0       The following portions of the patient's history were reviewed and updated as appropriate: allergies, current medications, past family history, past medical history, past social history, past surgical history and problem list     Review of Systems    Pertinent items are noted in HPI  Objective    Physical Exam  Constitutional:       Appearance: Normal appearance  She is well-developed  Genitourinary:      Vulva, bladder and urethral meatus normal       No lesions in the vagina  Right Labia: No rash, tenderness, lesions, skin changes or Bartholin's cyst      Left Labia: No tenderness, lesions, skin changes, Bartholin's cyst or rash  No labial fusion noted  No inguinal adenopathy present in the right or left side  No vaginal discharge, erythema, tenderness, bleeding or granulation tissue  No vaginal prolapse present  No vaginal atrophy present  Right Adnexa: not tender, not full and no mass present  Left Adnexa: not tender, not full and no mass present       Cervix is nulliparous  No cervical motion tenderness, discharge, friability, lesion, polyp or nabothian cyst       Uterus is not enlarged, tender, irregular or prolapsed  No uterine mass detected  Uterus is anteverted  Pelvic exam was performed with patient in the lithotomy position  Breasts:     Breasts are symmetrical       Right: No inverted nipple, mass, nipple discharge, skin change or tenderness  Left: No inverted nipple, mass, nipple discharge, skin change or tenderness  HENT:      Head: Normocephalic and atraumatic  Neck:      Thyroid: No thyromegaly  Cardiovascular:      Rate and Rhythm: Normal rate and regular rhythm  Heart sounds: Normal heart sounds, S1 normal and S2 normal    Pulmonary:      Effort: Pulmonary effort is normal       Breath sounds: Normal breath sounds  Abdominal:      General: Bowel sounds are normal  There is no distension  Palpations: Abdomen is soft  There is no mass  Tenderness: There is no abdominal tenderness  There is no guarding  Hernia: There is no hernia in the left inguinal area or right inguinal area  Musculoskeletal:      Cervical back: Neck supple  Lymphadenopathy:      Cervical: No cervical adenopathy  Upper Body:      Right upper body: No supraclavicular or axillary adenopathy  Left upper body: No supraclavicular or axillary adenopathy  Lower Body: No right inguinal adenopathy  No left inguinal adenopathy  Neurological:      Mental Status: She is alert  Skin:     General: Skin is warm and dry  Findings: No rash  Psychiatric:         Attention and Perception: Attention and perception normal          Mood and Affect: Mood and affect normal          Speech: Speech normal          Behavior: Behavior is cooperative  Thought Content: Thought content normal          Cognition and Memory: Cognition and memory normal          Judgment: Judgment normal    Vitals and nursing note reviewed  Assessment and Plan    Ivonne Lynch was seen today for gynecologic exam     Diagnoses and all orders for this visit:    Women's annual routine gynecological examination        Patient informed of a Stable GYN exam  A pap smear was not performed due to a normal pap in 2020  I have discussed the importance of exercise and healthy diet as well as adequate intake of calcium and vitamin D  The current ASCCP guidelines were reviewed  The low risk patient will receive pap smear screening every 3 years until the age of 34 and then every 3 to 5 years with HPV co-testing from the ages of 33-67  I emphasized the importance of an annual pelvic and breast exam  A yearly mammogram is recommended for breast cancer screening starting at age 36  Results will be released to [facility], if abnormal will call to review and discuss treatment plan  All questions have been answered to her satisfaction  Follow up in: 1 year or sooner if needed

## 2023-05-11 ENCOUNTER — OFFICE VISIT (OUTPATIENT)
Dept: FAMILY MEDICINE CLINIC | Facility: CLINIC | Age: 23
End: 2023-05-11

## 2023-05-11 VITALS
SYSTOLIC BLOOD PRESSURE: 112 MMHG | HEART RATE: 71 BPM | HEIGHT: 64 IN | BODY MASS INDEX: 24.75 KG/M2 | DIASTOLIC BLOOD PRESSURE: 98 MMHG | TEMPERATURE: 97.7 F | WEIGHT: 145 LBS | OXYGEN SATURATION: 99 %

## 2023-05-11 DIAGNOSIS — Z13.1 SCREENING FOR DIABETES MELLITUS: ICD-10-CM

## 2023-05-11 DIAGNOSIS — Z00.00 ANNUAL PHYSICAL EXAM: Primary | ICD-10-CM

## 2023-05-11 DIAGNOSIS — Z13.220 SCREENING FOR LIPID DISORDERS: ICD-10-CM

## 2023-05-11 DIAGNOSIS — Z13.0 SCREENING FOR DEFICIENCY ANEMIA: ICD-10-CM

## 2023-05-11 DIAGNOSIS — Z82.49 FAMILY HISTORY OF AORTIC ANEURYSM: ICD-10-CM

## 2023-05-11 PROBLEM — N75.0 BARTHOLIN GLAND CYST: Status: RESOLVED | Noted: 2019-01-09 | Resolved: 2023-05-11

## 2023-05-11 NOTE — PATIENT INSTRUCTIONS
Wellness Visit for Adults   AMBULATORY CARE:   A wellness visit  is when you see your healthcare provider to get screened for health problems  Your healthcare provider will also give you advice on how to stay healthy  Write down your questions so you remember to ask them  Ask your healthcare provider how often you should have a wellness visit  What happens at a wellness visit:  Your healthcare provider will ask about your health, and your family history of health problems  This includes high blood pressure, heart disease, and cancer  He or she will ask if you have symptoms that concern you, if you smoke, and about your mood  You may also be asked about your intake of medicines, supplements, food, and alcohol  Any of the following may be done:  • Your weight  will be checked  Your height may also be checked so your body mass index (BMI) can be calculated  Your BMI shows if you are at a healthy weight  • Your blood pressure  and heart rate will be checked  Your temperature may also be checked  • Blood and urine tests  may be done  Blood tests may be done to check your cholesterol levels  Abnormal cholesterol levels increase your risk for heart disease and stroke  You may also need a blood or urine test to check for diabetes if you are at increased risk  Urine tests may be done to look for signs of an infection or kidney disease  • A physical exam  includes checking your heartbeat and lungs with a stethoscope  Your healthcare provider may also check your skin to look for sun damage  • Screening tests  may be recommended  A screening test is done to check for diseases that may not cause symptoms  The screening tests you may need depend on your age, gender, family history, and lifestyle habits  For example, colorectal screening may be recommended if you are 48years old or older  Screening tests you need if you are a woman:   • A Pap smear  is used to screen for cervical cancer   Pap smears are usually done every 3 to 5 years depending on your age  You may need them more often if you have had abnormal Pap smear test results in the past  Ask your healthcare provider how often you should have a Pap smear  • A mammogram  is an x-ray of your breasts to screen for breast cancer  Experts recommend mammograms every 2 years starting at age 48 years  You may need a mammogram at age 52 years or younger if you have an increased risk for breast cancer  Talk to your healthcare provider about when you should start having mammograms and how often you need them  Vaccines you may need:   • Get an influenza vaccine  every year  The influenza vaccine protects you from the flu  Several types of viruses cause the flu  The viruses change over time, so new vaccines are made each year  • Get a tetanus-diphtheria (Td) booster vaccine  every 10 years  This vaccine protects you against tetanus and diphtheria  Tetanus is a severe infection that may cause painful muscle spasms and lockjaw  Diphtheria is a severe bacterial infection that causes a thick covering in the back of your mouth and throat  • Get a human papillomavirus (HPV) vaccine  if you are female and aged 23 to 32 or male 23 to 24 and never received it  This vaccine protects you from HPV infection  HPV is the most common infection spread by sexual contact  HPV may also cause vaginal, penile, and anal cancers  • Get a pneumococcal vaccine  if you are aged 72 years or older  The pneumococcal vaccine is an injection given to protect you from pneumococcal disease  Pneumococcal disease is an infection caused by pneumococcal bacteria  The infection may cause pneumonia, meningitis, or an ear infection  • Get a shingles vaccine  if you are 60 or older, even if you have had shingles before  The shingles vaccine is an injection to protect you from the varicella-zoster virus  This is the same virus that causes chickenpox   Shingles is a painful rash that develops in people who had chickenpox or have been exposed to the virus  How to eat healthy:  My Plate is a model for planning healthy meals  It shows the types and amounts of foods that should go on your plate  Fruits and vegetables make up about half of your plate, and grains and protein make up the other half  A serving of dairy is included on the side of your plate  The amount of calories and serving sizes you need depends on your age, gender, weight, and height  Examples of healthy foods are listed below:  • Eat a variety of vegetables  such as dark green, red, and orange vegetables  You can also include canned vegetables low in sodium (salt) and frozen vegetables without added butter or sauces  • Eat a variety of fresh fruits , canned fruit in 100% juice, frozen fruit, and dried fruit  • Include whole grains  At least half of the grains you eat should be whole grains  Examples include whole-wheat bread, wheat pasta, brown rice, and whole-grain cereals such as oatmeal     • Eat a variety of protein foods such as seafood (fish and shellfish), lean meat, and poultry without skin (turkey and chicken)  Examples of lean meats include pork leg, shoulder, or tenderloin, and beef round, sirloin, tenderloin, and extra lean ground beef  Other protein foods include eggs and egg substitutes, beans, peas, soy products, nuts, and seeds  • Choose low-fat dairy products such as skim or 1% milk or low-fat yogurt, cheese, and cottage cheese  • Limit unhealthy fats  such as butter, hard margarine, and shortening  Exercise:  Exercise at least 30 minutes per day on most days of the week  Some examples of exercise include walking, biking, dancing, and swimming  You can also fit in more physical activity by taking the stairs instead of the elevator or parking farther away from stores  Include muscle strengthening activities 2 days each week  Regular exercise provides many health benefits   It helps you manage your weight, and decreases your risk for type 2 diabetes, heart disease, stroke, and high blood pressure  Exercise can also help improve your mood  Ask your healthcare provider about the best exercise plan for you  General health and safety guidelines:   • Do not smoke  Nicotine and other chemicals in cigarettes and cigars can cause lung damage  Ask your healthcare provider for information if you currently smoke and need help to quit  E-cigarettes or smokeless tobacco still contain nicotine  Talk to your healthcare provider before you use these products  • Limit alcohol  A drink of alcohol is 12 ounces of beer, 5 ounces of wine, or 1½ ounces of liquor  • Lose weight, if needed  Being overweight increases your risk of certain health conditions  These include heart disease, high blood pressure, type 2 diabetes, and certain types of cancer  • Protect your skin  Do not sunbathe or use tanning beds  Use sunscreen with a SPF 15 or higher  Apply sunscreen at least 15 minutes before you go outside  Reapply sunscreen every 2 hours  Wear protective clothing, hats, and sunglasses when you are outside  • Drive safely  Always wear your seatbelt  Make sure everyone in your car wears a seatbelt  A seatbelt can save your life if you are in an accident  Do not use your cell phone when you are driving  This could distract you and cause an accident  Pull over if you need to make a call or send a text message  • Practice safe sex  Use latex condoms if are sexually active and have more than one partner  Your healthcare provider may recommend screening tests for sexually transmitted infections (STIs)  • Wear helmets, lifejackets, and protective gear  Always wear a helmet when you ride a bike or motorcycle, go skiing, or play sports that could cause a head injury  Wear protective equipment when you play sports  Wear a lifejacket when you are on a boat or doing water sports      © Copyright [employer] 2022 Information is for End User's use only and may not be sold, redistributed or otherwise used for commercial purposes  The above information is an  only  It is not intended as medical advice for individual conditions or treatments  Talk to your doctor, nurse or pharmacist before following any medical regimen to see if it is safe and effective for you  Cholesterol and Your Health   AMBULATORY CARE:   Cholesterol  is a waxy, fat-like substance  Your body uses cholesterol to make hormones and new cells, and to protect nerves  Cholesterol is made by your body  It also comes from certain foods you eat, such as meat and dairy products  Your healthcare provider can help you set goals for your cholesterol levels  He or she can help you create a plan to meet your goals  Cholesterol level goals: Your cholesterol level goals depend on your risk for heart disease, your age, and your other health conditions  The following are general guidelines:  • Total cholesterol  includes low-density lipoprotein (LDL), high-density lipoprotein (HDL), and triglyceride levels  The total cholesterol level should be lower than 200 mg/dL and is best at about 150 mg/dL  • LDL cholesterol  is called bad cholesterol  because it forms plaque in your arteries  As plaque builds up, your arteries become narrow, and less blood flows through  When plaque decreases blood flow to your heart, you may have chest pain  If plaque completely blocks an artery that brings blood to your heart, you may have a heart attack  Plaque can break off and form blood clots  Blood clots may block arteries in your brain and cause a stroke  The level should be less than 130 mg/dL and is best at about 100 mg/dL  • HDL cholesterol  is called good cholesterol  because it helps remove LDL cholesterol from your arteries  It does this by attaching to LDL cholesterol and carrying it to your liver  Your liver breaks down LDL cholesterol so your body can get rid of it   High levels of HDL cholesterol can help prevent a heart attack and stroke  Low levels of HDL cholesterol can increase your risk for heart disease, heart attack, and stroke  The level should be 60 mg/dL or higher  • Triglycerides  are a type of fat that store energy from foods you eat  High levels of triglycerides also cause plaque buildup  This can increase your risk for a heart attack or stroke  If your triglyceride level is high, your LDL cholesterol level may also be high  The level should be less than 150 mg/dL  Any of the following can increase your risk for high cholesterol:   • Smoking cigarettes    • Being overweight or obese, or not getting enough exercise    • Drinking large amounts of alcohol    • A medical condition such as hypertension (high blood pressure) or diabetes    • Certain genes passed from your parents to you    • Age older than 65 years    What you need to know about having your cholesterol levels checked: Adults 21to 39years of age should have their cholesterol levels checked every 4 to 6 years  Adults 45 years or older should have their cholesterol checked every 1 to 2 years  You may need your cholesterol checked more often, or at a younger age, if you have risk factors for heart disease  You may also need to have your cholesterol checked more often if you have other health conditions, such as diabetes  Blood tests are used to check cholesterol levels  Blood tests measure your levels of triglycerides, LDL cholesterol, and HDL cholesterol  How healthy fats affect your cholesterol levels:  Healthy fats, also called unsaturated fats, help lower LDL cholesterol and triglyceride levels  Healthy fats include the following:  • Monounsaturated fats  are found in foods such as olive oil, canola oil, avocado, nuts, and olives  • Polyunsaturated fats,  such as omega 3 fats, are found in fish, such as salmon, trout, and tuna   They can also be found in plant foods such as flaxseed, walnuts, and soybeans  How unhealthy fats affect your cholesterol levels:  Unhealthy fats increase LDL cholesterol and triglyceride levels  They are found in foods high in cholesterol, saturated fat, and trans fat:  • Cholesterol  is found in eggs, dairy, and meat  • Saturated fat  is found in butter, cheese, ice cream, whole milk, and coconut oil  Saturated fat is also found in meat, such as sausage, hot dogs, and bologna  • Trans fat  is found in liquid oils and is used in fried and baked foods  Foods that contain trans fats include chips, crackers, muffins, sweet rolls, microwave popcorn, and cookies  Treatment  for high cholesterol will also decrease your risk of heart disease, heart attack, and stroke  Treatment may include any of the following:  • Lifestyle changes  may include food, exercise, weight loss, and quitting smoking  You may also need to decrease the amount of alcohol you drink  Your healthcare provider will want you to start with lifestyle changes  Other treatment may be added if lifestyle changes are not enough  Your healthcare provider may recommend you work with a team to manage hyperlipidemia  The team may include medical experts such as a dietitian, an exercise or physical therapist, and a behavior therapist  Your family members may be included in helping you create lifestyle changes  • Medicines  may be given to lower your LDL cholesterol, triglyceride levels, or total cholesterol level  You may need medicines to lower your cholesterol if any of the following is true:    ? You have a history of stroke, TIA, unstable angina, or a heart attack  ? Your LDL cholesterol level is 190 mg/dL or higher  ? You are age 36 to 76 years, have diabetes or heart disease risk factors, and your LDL cholesterol is 70 mg/dL or higher  • Supplements  include fish oil, red yeast rice, and garlic  Fish oil may help lower your triglyceride and LDL cholesterol levels   It may also increase your HDL cholesterol level  Red yeast rice may help decrease your total cholesterol level and LDL cholesterol level  Garlic may help lower your total cholesterol level  Do not take any supplements without talking to your healthcare provider  Food changes you can make to lower your cholesterol levels:  A dietitian can help you create a healthy eating plan  He or she can show you how to read food labels and choose foods low in saturated fat, trans fats, and cholesterol  • Decrease the total amount of fat you eat  Choose lean meats, fat-free or 1% fat milk, and low-fat dairy products, such as yogurt and cheese  Try to limit or avoid red meats  Limit or do not eat fried foods or baked goods, such as cookies  • Replace unhealthy fats with healthy fats  Cook foods in olive oil or canola oil  Choose soft margarines that are low in saturated fat and trans fat  Seeds, nuts, and avocados are other examples of healthy fats  • Eat foods with omega-3 fats  Examples include salmon, tuna, mackerel, walnuts, and flaxseed  Eat fish 2 times per week  Pregnant women should not eat fish that have high levels of mercury, such as shark, swordfish, and nish mackerel  • Increase the amount of high-fiber foods you eat  High-fiber foods can help lower your LDL cholesterol  Aim to get between 20 and 30 grams of fiber each day  Fruits and vegetables are high in fiber  Eat at least 5 servings each day  Other high-fiber foods are whole-grain or whole-wheat breads, pastas, or cereals, and brown rice  Eat 3 ounces of whole-grain foods each day  Increase fiber slowly  You may have abdominal discomfort, bloating, and gas if you add fiber to your diet too quickly  • Eat healthy protein foods  Examples include low-fat dairy products, skinless chicken and turkey, fish, and nuts  • Limit foods and drinks that are high in sugar    Your dietitian or healthcare provider can help you create daily limits for high-sugar foods and drinks  The limit may be lower if you have diabetes or another health condition  Limits can also help you lose weight if needed  Lifestyle changes you can make to lower your cholesterol levels:   • Maintain a healthy weight  Ask your healthcare provider what a healthy weight is for you  Ask him or her to help you create a weight loss plan if needed  Weight loss can decrease your total cholesterol and triglyceride levels  Weight loss may also help keep your blood pressure at a healthy level  • Be physically active throughout the day  Physical activity, such as exercise, can help lower your total cholesterol level and maintain a healthy weight  Physical activity can also help increase your HDL cholesterol level  Work with your healthcare provider to create an program that is right for you  Get at least 30 to 40 minutes of moderate physical activity most days of the week  Examples of exercise include brisk walking, swimming, or biking  Also include strength training at least 2 times each week  Your healthcare providers can help you create a physical activity plan  • Do not smoke  Nicotine and other chemicals in cigarettes and cigars can raise your cholesterol levels  Ask your healthcare provider for information if you currently smoke and need help to quit  E-cigarettes or smokeless tobacco still contain nicotine  Talk to your healthcare provider before you use these products  • Limit or do not drink alcohol  Alcohol can increase your triglyceride levels  Ask your healthcare provider before you drink alcohol  Ask how much is okay for you to drink in 24 hours or 1 week  Follow up with your doctor as directed:  Write down your questions so you remember to ask them during your visits  © Copyright [employer] 2022 Information is for End User's use only and may not be sold, redistributed or otherwise used for commercial purposes  The above information is an  only   It is not intended as medical advice for individual conditions or treatments  Talk to your doctor, nurse or pharmacist before following any medical regimen to see if it is safe and effective for you

## 2023-05-11 NOTE — PROGRESS NOTES
[facility] [id number] [place]     NAME: Maria Ines Iraheta  AGE: 23 y o  SEX: female  : 2000     DATE: 2023     Assessment and Plan:     Problem List Items Addressed This Visit        Other    Family history of aortic aneurysm    Annual physical exam - Primary   Other Visit Diagnoses     Screening for lipid disorders        Relevant Orders    Lipid panel    Screening for diabetes mellitus        Relevant Orders    Comprehensive metabolic panel    Screening for deficiency anemia        Relevant Orders    CBC and differential          Immunizations and preventive care screenings were discussed with patient today  Appropriate education was printed on patient's after visit summary  Counseling:  Dental Health: discussed importance of regular tooth brushing, flossing, and dental visits  Injury prevention: discussed safety/seat belts, safety helmets, smoke detectors, carbon dioxide detectors, and smoking near bedding or upholstery  Exercise: the importance of regular exercise/physical activity was discussed  Recommend exercise 3-5 times per week for at least 30 minutes  Return in 1 year (on 2024)  Chief Complaint:     Chief Complaint   Patient presents with   • Physical Exam     Father just passed away from heart surgery and has some concerns patient would like referral to cardiology      History of Present Illness:     Adult Annual Physical   Patient here for a comprehensive physical exam  The patient reports no problems  Father recently passed away after surgery for ascending aortic aneurysm  Diet and Physical Activity  Diet/Nutrition: well balanced diet  Exercise: strength training exercises        Depression Screening  PHQ-2/9 Depression Screening    Little interest or pleasure in doing things: 1 - several days  Feeling down, depressed, or hopeless: 1 - several days  PHQ-2 Score: 2  PHQ-2 Interpretation: Negative depression screen       General Health  Sleep: sleeps well  Hearing: normal - bilateral   Vision: most recent eye exam >1 year ago and wears glasses  Dental: no dental visits for >1 year  /GYN Health  Last menstrual period: 04/20/2023  Contraceptive method: barrier methods  History of STDs?: no      Review of Systems:     Review of Systems   Constitutional: Negative for appetite change, fatigue, fever and unexpected weight change  HENT: Negative for dental problem, ear pain, hearing loss, mouth sores, nosebleeds, rhinorrhea, tinnitus, trouble swallowing and voice change  Eyes: Negative for photophobia, pain, discharge and visual disturbance  Respiratory: Negative for cough, chest tightness, shortness of breath and wheezing  Cardiovascular: Negative for chest pain and palpitations  Gastrointestinal: Negative for abdominal pain, blood in stool, constipation, diarrhea, nausea, rectal pain and vomiting  Endocrine: Negative for cold intolerance, polydipsia, polyphagia and polyuria  Genitourinary: Negative for decreased urine volume, difficulty urinating, dysuria, enuresis, frequency, genital sores, hematuria and urgency  Musculoskeletal: Negative for arthralgias, back pain, gait problem, joint swelling, myalgias, neck pain and neck stiffness  Skin: Negative for color change and rash  Allergic/Immunologic: Negative for environmental allergies, food allergies and immunocompromised state  Neurological: Negative for dizziness, seizures, speech difficulty, light-headedness and headaches  Hematological: Negative for adenopathy  Does not bruise/bleed easily  Psychiatric/Behavioral: Negative for behavioral problems, confusion, decreased concentration, self-injury and sleep disturbance  The patient is not nervous/anxious and is not hyperactive  Past Medical History:     History reviewed  No pertinent past medical history     Past Surgical History:     Past "Surgical History:   Procedure Laterality Date   • WISDOM TOOTH EXTRACTION        Social History:     Social History     Socioeconomic History   • Marital status: Single     Spouse name: None   • Number of children: None   • Years of education: None   • Highest education level: None   Occupational History   • None   Tobacco Use   • Smoking status: Never   • Smokeless tobacco: Never   Vaping Use   • Vaping Use: Never used   Substance and Sexual Activity   • Alcohol use: Yes   • Drug use: Never   • Sexual activity: Not Currently     Partners: Male     Birth control/protection: OCP   Other Topics Concern   • None   Social History Narrative   • None     Social Determinants of Health     Financial Resource Strain: Not on file   Food Insecurity: Not on file   Transportation Needs: Not on file   Physical Activity: Not on file   Stress: Not on file   Social Connections: Not on file   Intimate Partner Violence: Not on file   Housing Stability: Not on file      Family History:     Family History   Problem Relation Age of Onset   • Asthma Mother    • Hypertension Father    • Aneurysm Father    • Asthma Sister    • Diabetes Paternal Grandmother    • Heart disease Paternal Grandfather    • Hypertension Paternal Grandfather    • Hypertension Paternal Uncle       Current Medications:     No current outpatient medications on file  No current facility-administered medications for this visit  Allergies:     No Known Allergies   Physical Exam:     /98 (BP Location: Left arm, Patient Position: Sitting, Cuff Size: Adult)   Pulse 71   Temp 97 7 °F (36 5 °C)   Ht 5' 4\" (1 626 m)   Wt 65 8 kg (145 lb)   SpO2 99%   BMI 24 89 kg/m²     Physical Exam  Vitals and nursing note reviewed  Constitutional:       Appearance: Normal appearance  She is normal weight  HENT:      Head: Normocephalic and atraumatic        Right Ear: Tympanic membrane, ear canal and external ear normal       Left Ear: Tympanic membrane, ear canal " and external ear normal       Nose: Nose normal       Mouth/Throat:      Mouth: Mucous membranes are moist       Pharynx: Oropharynx is clear  Eyes:      Extraocular Movements: Extraocular movements intact  Conjunctiva/sclera: Conjunctivae normal    Neck:      Thyroid: No thyromegaly  Vascular: No carotid bruit  Cardiovascular:      Rate and Rhythm: Normal rate and regular rhythm  Pulses: Normal pulses  Heart sounds: Normal heart sounds  Pulmonary:      Effort: Pulmonary effort is normal       Breath sounds: Normal breath sounds  Abdominal:      General: Abdomen is flat  Bowel sounds are normal       Palpations: Abdomen is soft  There is no hepatomegaly, splenomegaly or mass  Tenderness: There is no abdominal tenderness  There is no right CVA tenderness or left CVA tenderness  Musculoskeletal:         General: Normal range of motion  Cervical back: Normal range of motion and neck supple  Right lower leg: No edema  Left lower leg: No edema  Lymphadenopathy:      Cervical: No cervical adenopathy  Skin:     General: Skin is warm and dry  Neurological:      General: No focal deficit present  Mental Status: She is alert and oriented to person, place, and time  Psychiatric:         Mood and Affect: Mood normal          Behavior: Behavior normal          Thought Content:  Thought content normal          Judgment: Judgment normal           NICOLLE Toro [id number]

## 2024-01-08 ENCOUNTER — OFFICE VISIT (OUTPATIENT)
Dept: OBGYN CLINIC | Facility: CLINIC | Age: 24
End: 2024-01-08

## 2024-01-08 VITALS
DIASTOLIC BLOOD PRESSURE: 64 MMHG | SYSTOLIC BLOOD PRESSURE: 133 MMHG | BODY MASS INDEX: 25.78 KG/M2 | WEIGHT: 150.2 LBS | HEART RATE: 72 BPM

## 2024-01-08 DIAGNOSIS — Z30.09 UNWANTED FERTILITY: Primary | ICD-10-CM

## 2024-01-08 DIAGNOSIS — N90.89 VULVAR MASS: ICD-10-CM

## 2024-01-08 DIAGNOSIS — N75.0 BARTHOLIN'S GLAND CYST: ICD-10-CM

## 2024-01-08 PROBLEM — Z00.00 ANNUAL PHYSICAL EXAM: Status: RESOLVED | Noted: 2023-05-11 | Resolved: 2024-01-08

## 2024-01-08 PROBLEM — Z82.49 FAMILY HISTORY OF AORTIC ANEURYSM: Status: RESOLVED | Noted: 2023-05-11 | Resolved: 2024-01-08

## 2024-01-08 PROCEDURE — 99213 OFFICE O/P EST LOW 20 MIN: CPT | Performed by: NURSE PRACTITIONER

## 2024-01-08 RX ORDER — NORGESTIMATE AND ETHINYL ESTRADIOL 0.25-0.035
1 KIT ORAL DAILY
Qty: 28 TABLET | Refills: 3 | Status: SHIPPED | OUTPATIENT
Start: 2024-01-08

## 2024-01-08 NOTE — PROGRESS NOTES
"PROBLEM GYNECOLOGICAL VISIT    Shana Pate is a 23 y.o. female who presents today with complaint of unwanted fertility, recurrent Bartholin's gland cyst, and vulvar cyst.  Her general medical history has been reviewed and she reports it as follows:    Past Medical History:   Diagnosis Date    Bartholin's gland cyst     req'd I&D      Past Surgical History:   Procedure Laterality Date    WISDOM TOOTH EXTRACTION       OB History          0    Para   0    Term   0       0    AB   0    Living   0         SAB   0    IAB   0    Ectopic   0    Multiple   0    Live Births   0               Social History     Tobacco Use    Smoking status: Never    Smokeless tobacco: Never   Vaping Use    Vaping status: Never Used   Substance Use Topics    Alcohol use: Yes     Comment: couple times/month    Drug use: Never     Social History     Substance and Sexual Activity   Sexual Activity Yes    Partners: Male    Birth control/protection: Condom Male       No current outpatient medications    History of Present Illness:   Reports that she has used OCP's successfully in the past (over 2 years ago) and desires to initiate contraception with them again.  Reports menses are regular and without issue.  She is sexually active and consistently uses condoms.  LMP=2023.   She reports that she has L sided Bartholin's gland cyst since  which comes and goes and required I&D and Word catheter insertion in .  Since then she experiences recurrence of the cyst nearly every time she becomes sexually aroused and it takes a day or two for it to resolve spontaneously.  She reports that it is very irritating and uncomfortable but rarely becomes painful/tender.  She is requesting surgical intervention as she is exhausted by the frequency recurrence.   She additionally reports that 2-3 days ago she noticed a \"lump\" on the R upper labia majora which is not painful/tender unless she pressed on it very hard.  Denies " vaginal discharge, odor, itching/irritation.  Denies pelvic pain.  Denies dysuria or difficulty urinating.    Review of Systems:  Review of Systems   Constitutional: Negative.    Gastrointestinal: Negative.    Genitourinary:  Negative for difficulty urinating, dysuria, menstrual problem, pelvic pain, vaginal discharge and vaginal pain.        L Bartholin's gland cyst and R labial lump       Physical Exam:  /64   Pulse 72   Wt 68.1 kg (150 lb 3.2 oz)   LMP 12/20/2023 (Approximate)   BMI 25.78 kg/m²   Physical Exam  Constitutional:       General: She is not in acute distress.  Genitourinary:      Right Labia: lesions (vulvar cyst).      Right Labia: No rash, tenderness, skin changes or Bartholin's cyst.     Left Labia: Bartholin's cyst (large, non-tender).      Left Labia: No tenderness, lesions, skin changes or rash.        Abdominal:      Palpations: Abdomen is soft.      Tenderness: There is no abdominal tenderness.   Neurological:      Mental Status: She is alert.   Skin:     General: Skin is warm and dry.   Vitals reviewed.       Assessment:   1. Vulvar mass.    Plan:   1. Imaging ordered: soft tissue ultrasound of vulvar mass.  Return 1 week later to review results with OBGYN surgeon and to also discuss management of Bartholin's gland cyst.   2. Given Rx OCP's and instructed on appropriate administration.  Return in 3 months for surveillance visit.   3. Return to office for annual GYN exam.

## 2024-01-08 NOTE — PATIENT INSTRUCTIONS
Thank you for your confidence in our team.   We appreciate you and welcome your feedback.   If you receive a survey from us, please take a few moments to let us know how we are doing.   Sincerely,  GERBER Kennedy

## 2024-01-09 ENCOUNTER — HOSPITAL ENCOUNTER (OUTPATIENT)
Dept: ULTRASOUND IMAGING | Facility: HOSPITAL | Age: 24
Discharge: HOME | End: 2024-01-09
Payer: COMMERCIAL

## 2024-01-09 DIAGNOSIS — N90.89 VULVAR MASS: ICD-10-CM

## 2024-01-09 PROCEDURE — 76705 ECHO EXAM OF ABDOMEN: CPT

## 2024-01-16 NOTE — PROGRESS NOTES
Subjective:     Shana Pate is a 23 y.o.  female who presents to discuss Bartholin gland cyst. The left side is swells to a golf ball size that can last a few days. This has been an issue since 2019. She has had an infection that needed to be drained back in . Since then she has not had another infection but has noted that her left labia will swell (usually with arousal) and decompress within a few days on its on. The only time she had pain is if it swells to the point that it causes significant friction.     Objective:    Vitals: Last menstrual period 2023.There is no height or weight on file to calculate BMI.    Physical Exam  Vitals reviewed.   Constitutional:       Appearance: Normal appearance.   Cardiovascular:      Rate and Rhythm: Normal rate.   Pulmonary:      Effort: Pulmonary effort is normal.   Abdominal:      Palpations: Abdomen is soft.   Genitourinary:         Comments: 5cm enlarged/inflamed left labia that is compressible. Non tender to touch. No erythema. No drainage.  Musculoskeletal:         General: No swelling or tenderness.   Skin:     General: Skin is warm and dry.   Neurological:      Mental Status: She is alert and oriented to person, place, and time.   Psychiatric:         Mood and Affect: Mood normal.         Assessment/Plan:    Left bartholin gland cyst  -Area of inflammation on the left labia is not exactly consistent with a cyst. Will plan for Ultrasound of the area to determine if a cyst if present. If so then will plan for removal of Bartholin gland.        Kelly Valdez MD  2024  9:47 AM

## 2024-01-17 ENCOUNTER — HOSPITAL ENCOUNTER (OUTPATIENT)
Dept: ULTRASOUND IMAGING | Facility: HOSPITAL | Age: 24
Discharge: HOME | End: 2024-01-17
Payer: COMMERCIAL

## 2024-01-17 ENCOUNTER — OFFICE VISIT (OUTPATIENT)
Dept: OBGYN CLINIC | Facility: CLINIC | Age: 24
End: 2024-01-17

## 2024-01-17 VITALS
DIASTOLIC BLOOD PRESSURE: 67 MMHG | BODY MASS INDEX: 26.47 KG/M2 | SYSTOLIC BLOOD PRESSURE: 130 MMHG | WEIGHT: 154.2 LBS | HEART RATE: 73 BPM

## 2024-01-17 DIAGNOSIS — N75.0 BARTHOLIN GLAND CYST: ICD-10-CM

## 2024-01-17 DIAGNOSIS — N75.0 BARTHOLIN GLAND CYST: Primary | ICD-10-CM

## 2024-01-17 PROCEDURE — 76705 ECHO EXAM OF ABDOMEN: CPT

## 2024-01-17 PROCEDURE — 99213 OFFICE O/P EST LOW 20 MIN: CPT | Performed by: OBSTETRICS & GYNECOLOGY

## 2024-01-31 ENCOUNTER — OFFICE VISIT (OUTPATIENT)
Dept: OBGYN CLINIC | Facility: CLINIC | Age: 24
End: 2024-01-31

## 2024-01-31 VITALS
SYSTOLIC BLOOD PRESSURE: 131 MMHG | HEIGHT: 64 IN | HEART RATE: 87 BPM | WEIGHT: 153.2 LBS | BODY MASS INDEX: 26.15 KG/M2 | DIASTOLIC BLOOD PRESSURE: 70 MMHG

## 2024-01-31 DIAGNOSIS — Z76.89 ENCOUNTER FOR INCISION AND DRAINAGE PROCEDURE: Primary | ICD-10-CM

## 2024-01-31 DIAGNOSIS — N75.0 BARTHOLIN GLAND CYST: ICD-10-CM

## 2024-01-31 PROCEDURE — 56420 I&D BARTHOLINS GLAND ABSCESS: CPT | Performed by: OBSTETRICS & GYNECOLOGY

## 2024-01-31 RX ORDER — LIDOCAINE HYDROCHLORIDE 10 MG/ML
4 INJECTION, SOLUTION EPIDURAL; INFILTRATION; INTRACAUDAL; PERINEURAL ONCE
Status: COMPLETED | OUTPATIENT
Start: 2024-01-31 | End: 2024-01-31

## 2024-01-31 RX ADMIN — LIDOCAINE HYDROCHLORIDE 4 ML: 10 INJECTION, SOLUTION EPIDURAL; INFILTRATION; INTRACAUDAL; PERINEURAL at 13:27

## 2024-01-31 NOTE — PROGRESS NOTES
"Subjective:     Shana Pate is a 23 y.o.  female who presents for follow-up for likely bartholin gland cyst. She had an Ultrasound done on 24 that showed a complex lesion with internal echogenicity measuring 5.9 x 3.3 x 2.5cm. Since her U/S she has not had much pain but has noticed the area has grown.     Objective:    Vitals: Blood pressure 131/70, pulse 87, height 5' 4\" (1.626 m), weight 69.5 kg (153 lb 3.2 oz), last menstrual period 2024.Body mass index is 26.3 kg/m².    Physical Exam  Vitals reviewed.   Constitutional:       Appearance: Normal appearance.   Cardiovascular:      Rate and Rhythm: Normal rate.   Pulmonary:      Effort: Pulmonary effort is normal.   Abdominal:      Palpations: Abdomen is soft.   Genitourinary:         Comments: 5cm swelling of left labia  Musculoskeletal:         General: No swelling or tenderness.   Skin:     General: Skin is warm and dry.   Neurological:      Mental Status: She is alert and oriented to person, place, and time.   Psychiatric:         Mood and Affect: Mood normal.         Incision and drain    Date/Time: 2024 10:10 AM    Performed by: Kelly Valdez MD  Authorized by: Kelly Valdez MD  Universal Protocol:  Consent: Verbal consent obtained. Written consent obtained.  Risks and benefits: risks, benefits and alternatives were discussed  Consent given by: patient  Patient understanding: patient states understanding of the procedure being performed  Patient consent: the patient's understanding of the procedure matches consent given  Procedure consent: procedure consent matches procedure scheduled  Relevant documents: relevant documents present and verified  Required items: required blood products, implants, devices, and special equipment available  Patient identity confirmed: verbally with patient    Patient location:  Clinic  Location:     Type:  Bartholin cyst    Size:  5cm    Location:  Anogenital    Anogenital location:  Bartholin's " gland  Pre-procedure details:     Skin preparation:  Betadine  Anesthesia (see MAR for exact dosages):     Anesthesia method:  Local infiltration    Local anesthetic:  Lidocaine 1% w/o epi  Procedure details:     Complexity:  Simple    Needle aspiration: no      Incision types:  Stab incision    Scalpel blade:  11    Approach:  Puncture    Incision depth:  Subcutaneous    Wound management:  Probed and deloculated    Drainage:  Serous    Drainage amount:  Moderate    Wound treatment:  Drain placed and wound left open    Packing materials:  Word catheter  Post-procedure details:     Patient tolerance of procedure:  Tolerated well, no immediate complications  Comments:      RTO in 2 days for follow-up        Assessment/Plan:    Bartholin gland I&D  -RTO in 2 days for follow up. Word catheter in place. Will plan for marsupialization in the future if it recurs.         Kelly Valdez MD  1/31/2024  10:06 AM

## 2024-02-02 ENCOUNTER — OFFICE VISIT (OUTPATIENT)
Dept: OBGYN CLINIC | Facility: CLINIC | Age: 24
End: 2024-02-02

## 2024-02-02 VITALS
WEIGHT: 151 LBS | DIASTOLIC BLOOD PRESSURE: 74 MMHG | BODY MASS INDEX: 25.92 KG/M2 | SYSTOLIC BLOOD PRESSURE: 116 MMHG | HEART RATE: 71 BPM

## 2024-02-02 DIAGNOSIS — N75.0 BARTHOLIN'S GLAND CYST: Primary | ICD-10-CM

## 2024-02-02 PROCEDURE — 99024 POSTOP FOLLOW-UP VISIT: CPT | Performed by: NURSE PRACTITIONER

## 2024-02-02 NOTE — PROGRESS NOTES
Shana Pate presents today for follow up of Bartholin's gland cyst.  She had I&D performed 1/31/2024 here in clinic with insertion of Word catheter.  She reports that she experienced much serous drainage for the first 24 hours but has had almost no drainage since then and catheter is still in place.  Denies pain but states catheter is uncomfortable.    /74   Pulse 71   Wt 68.5 kg (151 lb)   LMP 01/17/2024 (Approximate)   BMI 25.92 kg/m²     On exam, there is minimal swelling of left labia and incision site is non-erythematous and without edema.  Word catheter removed without issue.  Patient advised to keep area clean and dry.  Return to discuss marsupialization procedure with OBGYN physician.

## 2024-03-07 ENCOUNTER — OFFICE VISIT (OUTPATIENT)
Dept: OBGYN CLINIC | Facility: CLINIC | Age: 24
End: 2024-03-07

## 2024-03-07 VITALS
BODY MASS INDEX: 27.25 KG/M2 | DIASTOLIC BLOOD PRESSURE: 72 MMHG | HEART RATE: 76 BPM | SYSTOLIC BLOOD PRESSURE: 131 MMHG | HEIGHT: 64 IN | WEIGHT: 159.6 LBS

## 2024-03-07 DIAGNOSIS — N75.0 BARTHOLIN GLAND CYST: Primary | ICD-10-CM

## 2024-03-07 PROCEDURE — 99213 OFFICE O/P EST LOW 20 MIN: CPT | Performed by: OBSTETRICS & GYNECOLOGY

## 2024-03-07 NOTE — PROGRESS NOTES
"PROBLEM GYNECOLOGICAL VISIT    Shana Pate is a 23 y.o. female who presents today with complaint of left sided bartholin cyst.  Her general medical history has been reviewed and she reports it as follows:    Past Medical History:   Diagnosis Date    Bartholin's gland cyst     req'd I&D      Past Surgical History:   Procedure Laterality Date    WISDOM TOOTH EXTRACTION       OB History          0    Para   0    Term   0       0    AB   0    Living   0         SAB   0    IAB   0    Ectopic   0    Multiple   0    Live Births   0               Social History     Tobacco Use    Smoking status: Never    Smokeless tobacco: Never   Vaping Use    Vaping status: Never Used   Substance Use Topics    Alcohol use: Not Currently     Comment: couple times/month    Drug use: Never     Social History     Substance and Sexual Activity   Sexual Activity Yes    Partners: Male    Birth control/protection: Condom Male, OCP       Current Outpatient Medications   Medication Instructions    norgestimate-ethinyl estradiol (Sprintec 28) 0.25-35 MG-MCG per tablet 1 tablet, Oral, Daily       History of Present Illness:   Patient presents with a 4yr h/o persistent left vulva bartholin cyst. Patient states had it drained 2x in the whole 4yrs.    Review of Systems:  Review of Systems   Genitourinary:         Bartholin cyst   All other systems reviewed and are negative.      Physical Exam:  /72   Pulse 76   Ht 5' 4\" (1.626 m)   Wt 72.4 kg (159 lb 9.6 oz)   LMP 2024 (Exact Date)   BMI 27.40 kg/m²   Physical Exam  Constitutional:       Appearance: Normal appearance.   Genitourinary:      Genitourinary Comments: Left bartholin cyst      Right Labia: No rash, tenderness, lesions or skin changes.     Left Labia: Bartholin's cyst.      Left Labia: No tenderness, lesions, skin changes or rash.        Neurological:      Mental Status: She is alert.   Vitals reviewed.     Discussion:  Discuss with patient surgical " procedure.  Risked and benefits discussed.  Consent signed.    Assessment:   1. Left bartholin cyst    Plan:   1. Consent signed   2. Return to office will notify patient of preop and surgical date.   3. Patient's depression screening was assessed with a PHQ-2 score of 0. Their PHQ-9 score was 5. Clinically patient does not have depression. No treatment is required.    Reviewed with patient that [facility], but that they will not necessarily be reviewed by me immediately.  Explained that I will review results at my earliest opportunity and contact patient appropriately.

## 2024-04-09 ENCOUNTER — ANNUAL EXAM (OUTPATIENT)
Dept: OBGYN CLINIC | Facility: CLINIC | Age: 24
End: 2024-04-09

## 2024-04-09 VITALS
HEART RATE: 73 BPM | WEIGHT: 160 LBS | SYSTOLIC BLOOD PRESSURE: 133 MMHG | BODY MASS INDEX: 27.46 KG/M2 | DIASTOLIC BLOOD PRESSURE: 82 MMHG

## 2024-04-09 DIAGNOSIS — Z11.3 SCREEN FOR STD (SEXUALLY TRANSMITTED DISEASE): ICD-10-CM

## 2024-04-09 DIAGNOSIS — Z12.39 ENCOUNTER FOR BREAST CANCER SCREENING USING NON-MAMMOGRAM MODALITY: ICD-10-CM

## 2024-04-09 DIAGNOSIS — Z12.4 SCREENING FOR CERVICAL CANCER: ICD-10-CM

## 2024-04-09 DIAGNOSIS — Z01.419 ENCOUNTER FOR GYNECOLOGICAL EXAMINATION WITHOUT ABNORMAL FINDING: Primary | ICD-10-CM

## 2024-04-09 DIAGNOSIS — Z30.09 UNWANTED FERTILITY: ICD-10-CM

## 2024-04-09 PROCEDURE — 87491 CHLMYD TRACH DNA AMP PROBE: CPT | Performed by: NURSE PRACTITIONER

## 2024-04-09 PROCEDURE — S0612 ANNUAL GYNECOLOGICAL EXAMINA: HCPCS | Performed by: NURSE PRACTITIONER

## 2024-04-09 PROCEDURE — 87591 N.GONORRHOEAE DNA AMP PROB: CPT | Performed by: NURSE PRACTITIONER

## 2024-04-09 RX ORDER — NORGESTIMATE AND ETHINYL ESTRADIOL 0.25-0.035
1 KIT ORAL DAILY
Qty: 28 TABLET | Refills: 12 | Status: SHIPPED | OUTPATIENT
Start: 2024-04-09

## 2024-04-09 NOTE — LETTER
2024    To Shana Pate  : 2000      This letter is to advise you that your recent CULTURES for gonorrhea and chlamydia were reviewed by me and are NORMAL.  Please contact the office for an appointment if you have any additional concerns.    GERBER Kennedy

## 2024-04-09 NOTE — PROGRESS NOTES
ANNUAL GYNECOLOGICAL EXAMINATION    Shana Pate is a 23 y.o. female who presents today for annual GYN exam.  Her last pap smear was performed 2020 and result was NILM.  She reports no history of abnormal pap smears in her past.   I have no documentation of HIV screening in her past.  She reports menses as regular.  Patient's last menstrual period was 2024 (approximate).  Her general medical history has been reviewed and she reports it as follows:    Past Medical History:   Diagnosis Date    Bartholin's gland cyst     bilateral, recurrent     Past Surgical History:   Procedure Laterality Date    WISDOM TOOTH EXTRACTION       OB History          0    Para   0    Term   0       0    AB   0    Living   0         SAB   0    IAB   0    Ectopic   0    Multiple   0    Live Births   0               Social History     Tobacco Use    Smoking status: Never    Smokeless tobacco: Never   Vaping Use    Vaping status: Never Used   Substance Use Topics    Alcohol use: Not Currently     Comment: couple times/month    Drug use: Never     Social History     Substance and Sexual Activity   Sexual Activity Yes    Partners: Male    Birth control/protection: Condom Male, OCP     Cancer-related family history is negative for Breast cancer, Colon cancer, Ovarian cancer, and Cancer.    Current Outpatient Medications   Medication Instructions    norgestimate-ethinyl estradiol (Sprintec 28) 0.25-35 MG-MCG per tablet 1 tablet, Oral, Daily       Review of Systems:  Review of Systems   Constitutional: Negative.    Gastrointestinal: Negative.    Genitourinary:  Negative for difficulty urinating, menstrual problem, pelvic pain and vaginal discharge.   Skin: Negative.        Physical Exam:  /82 (BP Location: Left arm, Patient Position: Sitting, Cuff Size: Standard)   Pulse 73   Wt 72.6 kg (160 lb)   LMP 2024 (Approximate)   BMI 27.46 kg/m²   Physical Exam  Constitutional:       General: She is not in  acute distress.     Appearance: She is well-developed.   Genitourinary:      Vulva normal.      No lesions in the vagina.      Left Labia: Bartholin's cyst (small, nontender).          Right Adnexa: not tender and no mass present.     Left Adnexa: not tender and no mass present.     No cervical motion tenderness or lesion.      Uterus is not tender.   Breasts:     Right: No mass, nipple discharge, skin change or tenderness.      Left: No mass, nipple discharge, skin change or tenderness.   Neck:      Thyroid: No thyromegaly.   Cardiovascular:      Rate and Rhythm: Normal rate and regular rhythm.   Pulmonary:      Effort: Pulmonary effort is normal.   Abdominal:      Palpations: Abdomen is soft.      Tenderness: There is no abdominal tenderness.   Musculoskeletal:      Cervical back: Neck supple.   Neurological:      Mental Status: She is alert and oriented to person, place, and time.   Skin:     General: Skin is warm and dry.   Vitals reviewed.       Assessment/Plan:   1. Normal well-woman GYN exam.  2. Cervical cancer screening:  Normal cervical exam.  Pap smear done with HPV reflex.  Has not received HPV vaccine in the past, but she desires to initiate vaccine series now.  Will return for first HPV vaccine in 3 days then and she will return in 2 and 6 months for subsequent vaccinations.     3. STD screening:  Orders placed for vaginal GC/CT cultures.  Orders placed for serum anti-HIV, anti-HCV, HbsAg, syphilis panel.   4. Breast cancer screening:  Normal breast exam.  Reviewed breast self-awareness.   5. Depression Screening: Patient's depression screening was assessed with a PHQ-2 score of 0. Their PHQ-9 score was 7. Clinically patient does not have depression. No treatment is required.   6. BMI Counseling: Body mass index is 27.46 kg/m².  No intervention indicated.   7. Contraception:  OCP's.  Given Rx refills for another year.   8. Return to office in 1 year for annual GYN exam.    Reviewed with patient that  test results are available in [facility] immediately, but that they will not necessarily be reviewed by me immediately.  Explained that I will review results at my earliest opportunity and contact patient appropriately.

## 2024-04-11 LAB
C TRACH DNA SPEC QL NAA+PROBE: NEGATIVE
N GONORRHOEA DNA SPEC QL NAA+PROBE: NEGATIVE

## 2024-04-12 ENCOUNTER — CLINICAL SUPPORT (OUTPATIENT)
Dept: OBGYN CLINIC | Facility: CLINIC | Age: 24
End: 2024-04-12

## 2024-04-12 VITALS
DIASTOLIC BLOOD PRESSURE: 78 MMHG | WEIGHT: 160.4 LBS | BODY MASS INDEX: 27.39 KG/M2 | HEART RATE: 82 BPM | HEIGHT: 64 IN | SYSTOLIC BLOOD PRESSURE: 125 MMHG

## 2024-04-12 DIAGNOSIS — Z23 NEED FOR HPV VACCINE: Primary | ICD-10-CM

## 2024-04-12 PROCEDURE — 90651 9VHPV VACCINE 2/3 DOSE IM: CPT

## 2024-04-12 PROCEDURE — 90471 IMMUNIZATION ADMIN: CPT

## 2024-04-12 NOTE — PROGRESS NOTES
1st HPV given to patient in left deltoid on 4/12/2024    NDC: 8164-6474-61  LOT: [id number]  EXP: 05/31/2026

## 2024-04-15 LAB
LAB AP GYN PRIMARY INTERPRETATION: NORMAL
Lab: NORMAL

## 2024-05-10 LAB
ALBUMIN SERPL-MCNC: 4.3 G/DL (ref 3.6–5.1)
ALBUMIN/GLOB SERPL: 1.5 (CALC) (ref 1–2.5)
ALP SERPL-CCNC: 45 U/L (ref 31–125)
ALT SERPL-CCNC: 11 U/L (ref 6–29)
AST SERPL-CCNC: 12 U/L (ref 10–30)
BASOPHILS # BLD AUTO: 40 CELLS/UL (ref 0–200)
BASOPHILS NFR BLD AUTO: 0.8 %
BILIRUB SERPL-MCNC: 0.3 MG/DL (ref 0.2–1.2)
BUN SERPL-MCNC: 11 MG/DL (ref 7–25)
BUN/CREAT SERPL: NORMAL (CALC) (ref 6–22)
CALCIUM SERPL-MCNC: 9.2 MG/DL (ref 8.6–10.2)
CHLORIDE SERPL-SCNC: 104 MMOL/L (ref 98–110)
CHOLEST SERPL-MCNC: 183 MG/DL
CHOLEST/HDLC SERPL: 2.5 (CALC)
CO2 SERPL-SCNC: 29 MMOL/L (ref 20–32)
CREAT SERPL-MCNC: 0.61 MG/DL (ref 0.5–0.96)
EOSINOPHIL # BLD AUTO: 230 CELLS/UL (ref 15–500)
EOSINOPHIL NFR BLD AUTO: 4.6 %
ERYTHROCYTE [DISTWIDTH] IN BLOOD BY AUTOMATED COUNT: 12 % (ref 11–15)
GFR/BSA.PRED SERPLBLD CYS-BASED-ARV: 127 ML/MIN/1.73M2
GLOBULIN SER CALC-MCNC: 2.9 G/DL (CALC) (ref 1.9–3.7)
GLUCOSE SERPL-MCNC: 95 MG/DL (ref 65–99)
HCT VFR BLD AUTO: 42.5 % (ref 35–45)
HDLC SERPL-MCNC: 74 MG/DL
HGB BLD-MCNC: 13.8 G/DL (ref 11.7–15.5)
LDLC SERPL CALC-MCNC: 94 MG/DL (CALC)
LYMPHOCYTES # BLD AUTO: 1440 CELLS/UL (ref 850–3900)
LYMPHOCYTES NFR BLD AUTO: 28.8 %
MCH RBC QN AUTO: 28.8 PG (ref 27–33)
MCHC RBC AUTO-ENTMCNC: 32.5 G/DL (ref 32–36)
MCV RBC AUTO: 88.5 FL (ref 80–100)
MONOCYTES # BLD AUTO: 470 CELLS/UL (ref 200–950)
MONOCYTES NFR BLD AUTO: 9.4 %
NEUTROPHILS # BLD AUTO: 2820 CELLS/UL (ref 1500–7800)
NEUTROPHILS NFR BLD AUTO: 56.4 %
NONHDLC SERPL-MCNC: 109 MG/DL (CALC)
PLATELET # BLD AUTO: 295 THOUSAND/UL (ref 140–400)
PMV BLD REES-ECKER: 11.2 FL (ref 7.5–12.5)
POTASSIUM SERPL-SCNC: 4.6 MMOL/L (ref 3.5–5.3)
PROT SERPL-MCNC: 7.2 G/DL (ref 6.1–8.1)
RBC # BLD AUTO: 4.8 MILLION/UL (ref 3.8–5.1)
SODIUM SERPL-SCNC: 140 MMOL/L (ref 135–146)
TRIGL SERPL-MCNC: 60 MG/DL
WBC # BLD AUTO: 5 THOUSAND/UL (ref 3.8–10.8)

## 2024-05-14 ENCOUNTER — OFFICE VISIT (OUTPATIENT)
Dept: FAMILY MEDICINE CLINIC | Facility: CLINIC | Age: 24
End: 2024-05-14
Payer: COMMERCIAL

## 2024-05-14 VITALS
HEART RATE: 79 BPM | HEIGHT: 64 IN | BODY MASS INDEX: 27.31 KG/M2 | SYSTOLIC BLOOD PRESSURE: 106 MMHG | TEMPERATURE: 97.8 F | DIASTOLIC BLOOD PRESSURE: 72 MMHG | WEIGHT: 160 LBS | OXYGEN SATURATION: 96 %

## 2024-05-14 DIAGNOSIS — Z00.00 ANNUAL PHYSICAL EXAM: Primary | ICD-10-CM

## 2024-05-14 DIAGNOSIS — F51.01 PRIMARY INSOMNIA: ICD-10-CM

## 2024-05-14 PROCEDURE — 99213 OFFICE O/P EST LOW 20 MIN: CPT | Performed by: PHYSICIAN ASSISTANT

## 2024-05-14 PROCEDURE — 99395 PREV VISIT EST AGE 18-39: CPT | Performed by: PHYSICIAN ASSISTANT

## 2024-05-14 RX ORDER — TRAZODONE HYDROCHLORIDE 50 MG/1
50 TABLET ORAL
Qty: 30 TABLET | Refills: 5 | Status: SHIPPED | OUTPATIENT
Start: 2024-05-14

## 2024-05-14 NOTE — PATIENT INSTRUCTIONS
Wellness Visit for Adults   AMBULATORY CARE:   A wellness visit  is when you see your healthcare provider to get screened for health problems. Your healthcare provider will also give you advice on how to stay healthy. Write down your questions so you remember to ask them. Ask your healthcare provider how often you should have a wellness visit.  What happens at a wellness visit:  Your healthcare provider will ask about your health, and your family history of health problems. This includes high blood pressure, heart disease, and cancer. He or she will ask if you have symptoms that concern you, if you smoke, and about your mood. You may also be asked about your intake of medicines, supplements, food, and alcohol. Any of the following may be done:  Your weight  will be checked. Your height may also be checked so your body mass index (BMI) can be calculated. Your BMI shows if you are at a healthy weight.    Your blood pressure  and heart rate will be checked. Your temperature may also be checked.    Blood and urine tests  may be done. Blood tests may be done to check your cholesterol levels. Abnormal cholesterol levels increase your risk for heart disease and stroke. You may also need a blood or urine test to check for diabetes if you are at increased risk. Urine tests may be done to look for signs of an infection or kidney disease.    A physical exam  includes checking your heartbeat and lungs with a stethoscope. Your healthcare provider may also check your skin to look for sun damage.    Screening tests  may be recommended. A screening test is done to check for diseases that may not cause symptoms. The screening tests you may need depend on your age, gender, family history, and lifestyle habits. For example, colorectal screening may be recommended if you are 50 years old or older.    Screening tests you need if you are a woman:   A Pap smear  is used to screen for cervical cancer. Pap smears are usually done every 3 to  5 years depending on your age. You may need them more often if you have had abnormal Pap smear test results in the past. Ask your healthcare provider how often you should have a Pap smear.    A mammogram  is an x-ray of your breasts to screen for breast cancer. Experts recommend mammograms every 2 years starting at age 50 years. You may need a mammogram at age 49 years or younger if you have an increased risk for breast cancer. Talk to your healthcare provider about when you should start having mammograms and how often you need them.    Vaccines you may need:   Get an influenza vaccine  every year. The influenza vaccine protects you from the flu. Several types of viruses cause the flu. The viruses change over time, so new vaccines are made each year.    Get a tetanus-diphtheria (Td) booster vaccine  every 10 years. This vaccine protects you against tetanus and diphtheria. Tetanus is a severe infection that may cause painful muscle spasms and lockjaw. Diphtheria is a severe bacterial infection that causes a thick covering in the back of your mouth and throat.    Get a human papillomavirus (HPV) vaccine  if you are female and aged 19 to 26 or male 19 to 21 and never received it. This vaccine protects you from HPV infection. HPV is the most common infection spread by sexual contact. HPV may also cause vaginal, penile, and anal cancers.    Get a pneumococcal vaccine  if you are aged 65 years or older. The pneumococcal vaccine is an injection given to protect you from pneumococcal disease. Pneumococcal disease is an infection caused by pneumococcal bacteria. The infection may cause pneumonia, meningitis, or an ear infection.    Get a shingles vaccine  if you are 60 or older, even if you have had shingles before. The shingles vaccine is an injection to protect you from the varicella-zoster virus. This is the same virus that causes chickenpox. Shingles is a painful rash that develops in people who had chickenpox or have  been exposed to the virus.    How to eat healthy:  My Plate is a model for planning healthy meals. It shows the types and amounts of foods that should go on your plate. Fruits and vegetables make up about half of your plate, and grains and protein make up the other half. A serving of dairy is included on the side of your plate. The amount of calories and serving sizes you need depends on your age, gender, weight, and height. Examples of healthy foods are listed below:  Eat a variety of vegetables  such as dark green, red, and orange vegetables. You can also include canned vegetables low in sodium (salt) and frozen vegetables without added butter or sauces.    Eat a variety of fresh fruits , canned fruit in 100% juice, frozen fruit, and dried fruit.    Include whole grains.  At least half of the grains you eat should be whole grains. Examples include whole-wheat bread, wheat pasta, brown rice, and whole-grain cereals such as oatmeal.    Eat a variety of protein foods such as seafood (fish and shellfish), lean meat, and poultry without skin (turkey and chicken). Examples of lean meats include pork leg, shoulder, or tenderloin, and beef round, sirloin, tenderloin, and extra lean ground beef. Other protein foods include eggs and egg substitutes, beans, peas, soy products, nuts, and seeds.    Choose low-fat dairy products such as skim or 1% milk or low-fat yogurt, cheese, and cottage cheese.    Limit unhealthy fats  such as butter, hard margarine, and shortening.       Exercise:  Exercise at least 30 minutes per day on most days of the week. Some examples of exercise include walking, biking, dancing, and swimming. You can also fit in more physical activity by taking the stairs instead of the elevator or parking farther away from stores. Include muscle strengthening activities 2 days each week. Regular exercise provides many health benefits. It helps you manage your weight, and decreases your risk for type 2 diabetes,  heart disease, stroke, and high blood pressure. Exercise can also help improve your mood. Ask your healthcare provider about the best exercise plan for you.       General health and safety guidelines:   Do not smoke.  Nicotine and other chemicals in cigarettes and cigars can cause lung damage. Ask your healthcare provider for information if you currently smoke and need help to quit. E-cigarettes or smokeless tobacco still contain nicotine. Talk to your healthcare provider before you use these products.    Limit alcohol.  A drink of alcohol is 12 ounces of beer, 5 ounces of wine, or 1½ ounces of liquor.    Lose weight, if needed.  Being overweight increases your risk of certain health conditions. These include heart disease, high blood pressure, type 2 diabetes, and certain types of cancer.    Protect your skin.  Do not sunbathe or use tanning beds. Use sunscreen with a SPF 15 or higher. Apply sunscreen at least 15 minutes before you go outside. Reapply sunscreen every 2 hours. Wear protective clothing, hats, and sunglasses when you are outside.    Drive safely.  Always wear your seatbelt. Make sure everyone in your car wears a seatbelt. A seatbelt can save your life if you are in an accident. Do not use your cell phone when you are driving. This could distract you and cause an accident. Pull over if you need to make a call or send a text message.    Practice safe sex.  Use latex condoms if are sexually active and have more than one partner. Your healthcare provider may recommend screening tests for sexually transmitted infections (STIs).    Wear helmets, lifejackets, and protective gear.  Always wear a helmet when you ride a bike or motorcycle, go skiing, or play sports that could cause a head injury. Wear protective equipment when you play sports. Wear a lifejacket when you are on a boat or doing water sports.    © Copyright [employer] 2023 Information is for End User's use only and may not be sold, redistributed or  otherwise used for commercial purposes.  The above information is an  only. It is not intended as medical advice for individual conditions or treatments. Talk to your doctor, nurse or pharmacist before following any medical regimen to see if it is safe and effective for you.    Cholesterol and Your Health   AMBULATORY CARE:   Cholesterol  is a waxy, fat-like substance. Your body uses cholesterol to make hormones and new cells, and to protect nerves. Cholesterol is made by your body. It also comes from certain foods you eat, such as meat and dairy products. Your healthcare provider can help you set goals for your cholesterol levels. Your provider can help you create a plan to meet your goals.  Cholesterol level goals:  Your cholesterol level goals depend on your risk for heart disease, your age, and your other health conditions. The following are general guidelines:  Total cholesterol  includes low-density lipoprotein (LDL), high-density lipoprotein (HDL), and triglyceride levels. The total cholesterol level should be lower than 200 mg/dL and is best at about 150 mg/dL.    LDL cholesterol  is called bad cholesterol  because it forms plaque in your arteries. As plaque builds up, your arteries become narrow, and less blood flows through. When plaque decreases blood flow to your heart, you may have chest pain. If plaque completely blocks an artery that brings blood to your heart, you may have a heart attack. Plaque can break off and form blood clots. Blood clots may block arteries in your brain and cause a stroke. The level should be less than 130 mg/dL and is best at about 100 mg/dL.         HDL cholesterol  is called good cholesterol  because it helps remove LDL cholesterol from your arteries. It does this by attaching to LDL cholesterol and carrying it to your liver. Your liver breaks down LDL cholesterol so your body can get rid of it. High levels of HDL cholesterol can help prevent a heart attack and  stroke. Low levels of HDL cholesterol can increase your risk for heart disease, heart attack, and stroke. The level should be at least 40 mg/dL in males or at least 50 mg/dL in females.    Triglycerides  are a type of fat that store energy from foods you eat. High levels of triglycerides also cause plaque buildup. This can increase your risk for a heart attack or stroke. If your triglyceride level is high, your LDL cholesterol level may also be high. The level should be less than 150 mg/dL.    Any of the following can increase your risk for high cholesterol:   Smoking or drinking large amounts of alcohol    Having overweight or obesity, or not getting enough exercise    A medical condition such as hypertension (high blood pressure) or diabetes    A family history of high cholesterol    Age older than 65    What you need to know about having your cholesterol levels checked:  Adults 20 to 45 years of age should have their cholesterol levels checked every 4 to 6 years. Adults 45 years or older should have their cholesterol checked every 1 to 2 years. You may need your cholesterol checked more often, or at a younger age, if you have risk factors for heart disease. You may also need to have your cholesterol checked more often if you have other health conditions, such as diabetes. Blood tests are used to check cholesterol levels. Blood tests measure your levels of triglycerides, LDL cholesterol, and HDL cholesterol.  How healthy fats affect your cholesterol levels:  Healthy fats, also called unsaturated fats, help lower LDL cholesterol and triglyceride levels. Healthy fats include the following:  Monounsaturated fats  are found in foods such as olive oil, canola oil, avocado, nuts, and olives.    Polyunsaturated fats,  such as omega 3 fats, are found in fish, such as salmon, trout, and tuna. They can also be found in plant foods such as flaxseed, walnuts, and soybeans.    How unhealthy fats affect your cholesterol levels:   Unhealthy fats increase LDL cholesterol and triglyceride levels. They are found in foods high in cholesterol, saturated fat, and trans fat:  Cholesterol  is found in eggs, dairy, and meat.    Saturated fat  is found in butter, cheese, ice cream, whole milk, and coconut oil. Saturated fat is also found in meat, such as sausage, hot dogs, and bologna.    Trans fat  is found in liquid oils and is used in fried and baked foods. Foods that contain trans fats include chips, crackers, muffins, sweet rolls, microwave popcorn, and cookies.    Treatment  for high cholesterol will also decrease your risk of heart disease, heart attack, and stroke. Treatment may include any of the following:  Lifestyle changes  may include food, exercise, weight loss, and quitting smoking. You may also need to decrease the amount of alcohol you drink. Your healthcare provider will want you to start with lifestyle changes. Other treatment may be added if lifestyle changes are not enough. Your healthcare provider may recommend you work with a team to manage hyperlipidemia. The team may include medical experts such as a dietitian, an exercise or physical therapist, and a behavior therapist. Your family members may be included in helping you create lifestyle changes.    Medicines  may be given to lower your LDL cholesterol, triglyceride levels, or total cholesterol level. You may need medicines to lower your cholesterol if any of the following is true:    You have a history of stroke, TIA, unstable angina, or a heart attack.    Your LDL cholesterol level is 190 mg/dL or higher.    You are age 40 to 75 years, have diabetes or heart disease risk factors, and your LDL cholesterol is 70 mg/dL or higher.    Supplements  include fish oil, red yeast rice, and garlic. Fish oil may help lower your triglyceride and LDL cholesterol levels. It may also increase your HDL cholesterol level. Red yeast rice may help decrease your total cholesterol level and LDL  cholesterol level. Garlic may help lower your total cholesterol level. Do not take any supplements without talking to your healthcare provider.    Food changes you can make to lower your cholesterol levels:  A dietitian can help you create a healthy eating plan. Your dietitian can show you how to read food labels and choose foods low in saturated fat, trans fats, and cholesterol.     Decrease the total amount of fat you eat.  Choose lean meats, fat-free or 1% fat milk, and low-fat dairy products, such as yogurt and cheese. Try to limit or avoid red meats. Limit or do not eat fried foods or baked goods, such as cookies.    Replace unhealthy fats with healthy fats.  Cook foods in olive oil or canola oil. Choose soft margarines that are low in saturated fat and trans fat. Seeds, nuts, and avocados are other examples of healthy fats.    Eat foods with omega-3 fats.  Examples include salmon, tuna, mackerel, walnuts, and flaxseed. Eat fish 2 times per week. Pregnant women should not eat fish that have high levels of mercury, such as shark, swordfish, and nish mackerel.         Increase the amount of high-fiber foods you eat.  High-fiber foods can help lower your LDL cholesterol. Aim to get between 20 and 30 grams of fiber each day. Fruits and vegetables are high in fiber. Eat at least 5 servings each day. Other high-fiber foods are whole-grain or whole-wheat breads, pastas, or cereals, and brown rice. Eat 3 ounces of whole-grain foods each day. Increase fiber slowly. You may have abdominal discomfort, bloating, and gas if you add fiber to your diet too quickly.         Eat healthy protein foods.  Examples include low-fat dairy products, skinless chicken and turkey, fish, and nuts.    Limit foods and drinks that are high in sugar.  Your dietitian or healthcare provider can help you create daily limits for high-sugar foods and drinks. The limit may be lower if you have diabetes or another health condition. Limits can also  help you lose weight if needed.  Lifestyle changes you can make to lower your cholesterol levels:   Maintain a healthy weight.  Ask your healthcare provider what a healthy weight is for you. Ask your provider to help you create a weight loss plan if needed. Weight loss can decrease your total cholesterol and triglyceride levels. Weight loss may also help keep your blood pressure at a healthy level.    Be physically active throughout the day.  Physical activity, such as exercise, can help lower your total cholesterol level and maintain a healthy weight. Physical activity can also help increase your HDL cholesterol level. Work with your healthcare provider to create an program that is right for you. Get at least 30 to 40 minutes of moderate physical activity most days of the week. Examples of exercise include brisk walking, swimming, or biking. Also include strength training at least 2 times each week. Your healthcare providers can help you create a physical activity plan.            Do not smoke.  Nicotine and other chemicals in cigarettes and cigars can raise your cholesterol levels. Ask your healthcare provider for information if you currently smoke and need help to quit. E-cigarettes or smokeless tobacco still contain nicotine. Talk to your healthcare provider before you use these products.         Limit or do not drink alcohol.  Alcohol can increase your triglyceride levels. Ask your healthcare provider before you drink alcohol. Ask how much is okay for you to drink in 24 hours or 1 week.    Follow up with your doctor as directed:  Write down your questions so you remember to ask them during your visits.  © Copyright [employer] 2023 Information is for End User's use only and may not be sold, redistributed or otherwise used for commercial purposes.  The above information is an  only. It is not intended as medical advice for individual conditions or treatments. Talk to your doctor, nurse or pharmacist  before following any medical regimen to see if it is safe and effective for you.

## 2024-05-14 NOTE — PROGRESS NOTES
ADULT ANNUAL PHYSICAL  [facility] PRACTICE [address] [facility]    NAME: Shana Pate  AGE: 23 y.o. SEX: female  : 2000     DATE: 2024     Assessment and Plan:     Problem List Items Addressed This Visit        Neurology/Sleep    Primary insomnia    Relevant Medications    traZODone (DESYREL) 50 mg tablet       Other    Annual physical exam - Primary         Immunizations and preventive care screenings were discussed with patient today. Appropriate education was printed on patient's after visit summary.    Counseling:  Dental Health: discussed importance of regular tooth brushing, flossing, and dental visits.  Injury prevention: discussed safety/seat belts, safety helmets, smoke detectors, carbon dioxide detectors, and smoking near bedding or upholstery.  Exercise: the importance of regular exercise/physical activity was discussed. Recommend exercise 3-5 times per week for at least 30 minutes.          Return in about 1 year (around 2025) for Annual physical.     Chief Complaint:     Chief Complaint   Patient presents with   • Physical Exam      History of Present Illness:     Adult Annual Physical   Patient here for a comprehensive physical exam. The patient reports no problems. Pt is having difficulty falling asleep and staying asleep.    Diet and Physical Activity  Diet/Nutrition: well balanced diet.   Exercise: strength training exercises.      Depression Screening  PHQ-2/9 Depression Screening    Little interest or pleasure in doing things: 0 - not at all  Feeling down, depressed, or hopeless: 0 - not at all  PHQ-2 Score: 0  PHQ-2 Interpretation: Negative depression screen       General Health  Sleep: sleeps poorly, gets 4-6 hours of sleep on average, unrefreshing sleep, and broken sleep .   Hearing: normal - bilateral.  Vision: most recent eye exam >1 year ago and wears glasses.   Dental: regular dental visits.       /GYN  Health  Follows with gynecology? yes   Last menstrual period: 05/09/2024  Contraceptive method: oral contraceptives.  History of STDs?: no.     Advanced Care Planning  Do you have an advanced directive? no  Do you have a durable medical power of ? no  ACP document given to the patient? no      Review of Systems:     Review of Systems   Constitutional:  Negative for appetite change, fatigue, fever and unexpected weight change.   HENT:  Negative for dental problem, ear pain, hearing loss, mouth sores, nosebleeds, rhinorrhea, tinnitus, trouble swallowing and voice change.    Eyes:  Negative for photophobia, pain, discharge and visual disturbance.   Respiratory:  Negative for cough, chest tightness, shortness of breath and wheezing.    Cardiovascular:  Negative for chest pain and palpitations.   Gastrointestinal:  Negative for abdominal pain, blood in stool, constipation, diarrhea, nausea, rectal pain and vomiting.   Endocrine: Negative for cold intolerance, polydipsia, polyphagia and polyuria.   Genitourinary:  Negative for decreased urine volume, difficulty urinating, dysuria, enuresis, frequency, genital sores, hematuria and urgency.   Musculoskeletal:  Negative for arthralgias, back pain, gait problem, joint swelling, myalgias, neck pain and neck stiffness.   Skin:  Negative for color change and rash.   Allergic/Immunologic: Negative for environmental allergies, food allergies and immunocompromised state.   Neurological:  Negative for dizziness, seizures, speech difficulty, light-headedness and headaches.   Hematological:  Negative for adenopathy. Does not bruise/bleed easily.   Psychiatric/Behavioral:  Positive for sleep disturbance. Negative for behavioral problems, confusion, decreased concentration, self-injury and suicidal ideas. The patient is not nervous/anxious and is not hyperactive.       Past Medical History:     Past Medical History:   Diagnosis Date   • Bartholin's gland cyst     bilateral,  recurrent      Past Surgical History:     Past Surgical History:   Procedure Laterality Date   • WISDOM TOOTH EXTRACTION  2012      Social History:     Social History     Socioeconomic History   • Marital status: Single     Spouse name: None   • Number of children: None   • Years of education: None   • Highest education level: None   Occupational History   • None   Tobacco Use   • Smoking status: Never   • Smokeless tobacco: Never   Vaping Use   • Vaping status: Never Used   Substance and Sexual Activity   • Alcohol use: Not Currently     Comment: couple times/month   • Drug use: Never   • Sexual activity: Yes     Partners: Male     Birth control/protection: Condom Male, OCP   Other Topics Concern   • None   Social History Narrative   • None     Social Determinants of Health     Financial Resource Strain: Low Risk  (4/12/2024)    Overall Financial Resource Strain (CARDIA)    • Difficulty of Paying Living Expenses: Not hard at all   Food Insecurity: No Food Insecurity (4/12/2024)    Hunger Vital Sign    • Worried About Running Out of Food in the Last Year: Never true    • Ran Out of Food in the Last Year: Never true   Transportation Needs: No Transportation Needs (4/12/2024)    PRAPARE - Transportation    • Lack of Transportation (Medical): No    • Lack of Transportation (Non-Medical): No   Physical Activity: Not on file   Stress: Not on file   Social Connections: Not on file   Intimate Partner Violence: Not on file   Housing Stability: Low Risk  (4/12/2024)    Housing Stability Vital Sign    • Unable to Pay for Housing in the Last Year: No    • Number of Places Lived in the Last Year: 2    • Unstable Housing in the Last Year: No      Family History:     Family History   Problem Relation Age of Onset   • Asthma Mother    • Hypertension Father    • Aneurysm Father    • Asthma Sister    • Diabetes Paternal Grandmother    • Heart disease Paternal Grandfather    • Hypertension Paternal Grandfather    • Hypertension  "Paternal Uncle    • Breast cancer Neg Hx    • Colon cancer Neg Hx    • Ovarian cancer Neg Hx    • Cancer Neg Hx       Current Medications:     Current Outpatient Medications   Medication Sig Dispense Refill   • norgestimate-ethinyl estradiol (Sprintec 28) 0.25-35 MG-MCG per tablet Take 1 tablet by mouth daily 28 tablet 12   • traZODone (DESYREL) 50 mg tablet Take 1 tablet (50 mg total) by mouth daily at bedtime 30 tablet 5     No current facility-administered medications for this visit.      Allergies:     No Known Allergies   Physical Exam:     /72 (BP Location: Left arm, Patient Position: Sitting, Cuff Size: Standard)   Pulse 79   Temp 97.8 °F (36.6 °C) (Tympanic)   Ht 5' 4\" (1.626 m)   Wt 72.6 kg (160 lb)   SpO2 96%   BMI 27.46 kg/m²     Physical Exam     Liana Rashid PA-C   [facility] [address] [facility]    "

## 2024-06-21 ENCOUNTER — CLINICAL SUPPORT (OUTPATIENT)
Dept: OBGYN CLINIC | Facility: CLINIC | Age: 24
End: 2024-06-21

## 2024-06-21 VITALS
HEART RATE: 92 BPM | HEIGHT: 64 IN | BODY MASS INDEX: 27.69 KG/M2 | DIASTOLIC BLOOD PRESSURE: 79 MMHG | WEIGHT: 162.2 LBS | SYSTOLIC BLOOD PRESSURE: 130 MMHG

## 2024-06-21 DIAGNOSIS — Z23 NEED FOR HPV VACCINATION: Primary | ICD-10-CM

## 2024-06-21 PROCEDURE — 90651 9VHPV VACCINE 2/3 DOSE IM: CPT

## 2024-06-21 PROCEDURE — 90471 IMMUNIZATION ADMIN: CPT

## 2024-06-21 NOTE — PROGRESS NOTES
Patient given 2nd HPV vaccine in left deltoid.     NDC# 3162-5935-75  LOT# [id number]  EXP# 12/11/2025

## 2024-10-18 ENCOUNTER — TELEPHONE (OUTPATIENT)
Dept: OBGYN CLINIC | Facility: CLINIC | Age: 24
End: 2024-10-18

## 2025-04-16 ENCOUNTER — TELEPHONE (OUTPATIENT)
Dept: OBGYN CLINIC | Facility: CLINIC | Age: 25
End: 2025-04-16

## 2025-04-25 DIAGNOSIS — Z30.09 UNWANTED FERTILITY: ICD-10-CM

## 2025-04-28 RX ORDER — NORGESTIMATE AND ETHINYL ESTRADIOL 0.25-0.035
1 KIT ORAL DAILY
Qty: 28 TABLET | Refills: 0 | Status: SHIPPED | OUTPATIENT
Start: 2025-04-28

## 2025-04-29 NOTE — TELEPHONE ENCOUNTER
Called PT and left voice to contact our office reg; medication given for 1  month only and needs to schedule ANNUAL before any more refill can be given per Provider.